# Patient Record
Sex: FEMALE | Race: OTHER | HISPANIC OR LATINO | Employment: UNEMPLOYED | URBAN - METROPOLITAN AREA
[De-identification: names, ages, dates, MRNs, and addresses within clinical notes are randomized per-mention and may not be internally consistent; named-entity substitution may affect disease eponyms.]

---

## 2017-06-27 ENCOUNTER — HOSPITAL ENCOUNTER (EMERGENCY)
Facility: HOSPITAL | Age: 42
Discharge: HOME | End: 2017-06-27
Attending: EMERGENCY MEDICINE | Admitting: EMERGENCY MEDICINE
Payer: COMMERCIAL

## 2017-06-27 VITALS
RESPIRATION RATE: 18 BRPM | SYSTOLIC BLOOD PRESSURE: 176 MMHG | HEART RATE: 90 BPM | TEMPERATURE: 98.1 F | DIASTOLIC BLOOD PRESSURE: 82 MMHG | OXYGEN SATURATION: 100 %

## 2017-06-27 DIAGNOSIS — R10.9 ACUTE ABDOMINAL PAIN: Primary | ICD-10-CM

## 2017-06-27 DIAGNOSIS — E86.0 MILD DEHYDRATION: ICD-10-CM

## 2017-06-27 DIAGNOSIS — R19.7 NAUSEA, VOMITING AND DIARRHEA: ICD-10-CM

## 2017-06-27 DIAGNOSIS — R11.2 NAUSEA, VOMITING AND DIARRHEA: ICD-10-CM

## 2017-06-27 LAB
ALBUMIN SERPL BCP-MCNC: 4.4 G/DL (ref 3.5–5)
ALP SERPL-CCNC: 73 U/L (ref 46–116)
ALT SERPL W P-5'-P-CCNC: 38 U/L (ref 12–78)
ANION GAP SERPL CALCULATED.3IONS-SCNC: 8 MMOL/L (ref 4–13)
AST SERPL W P-5'-P-CCNC: 26 U/L (ref 5–45)
BACTERIA UR QL AUTO: ABNORMAL /HPF
BASOPHILS # BLD AUTO: 0.01 THOUSANDS/ΜL (ref 0–0.1)
BASOPHILS NFR BLD AUTO: 0 % (ref 0–1)
BILIRUB SERPL-MCNC: 0.55 MG/DL (ref 0.2–1)
BILIRUB UR QL STRIP: NEGATIVE
BUN SERPL-MCNC: 11 MG/DL (ref 5–25)
CALCIUM SERPL-MCNC: 9.1 MG/DL (ref 8.3–10.1)
CHLORIDE SERPL-SCNC: 103 MMOL/L (ref 100–108)
CLARITY UR: CLEAR
CO2 SERPL-SCNC: 27 MMOL/L (ref 21–32)
COLOR UR: YELLOW
CREAT SERPL-MCNC: 0.83 MG/DL (ref 0.6–1.3)
EOSINOPHIL # BLD AUTO: 0 THOUSAND/ΜL (ref 0–0.61)
EOSINOPHIL NFR BLD AUTO: 0 % (ref 0–6)
ERYTHROCYTE [DISTWIDTH] IN BLOOD BY AUTOMATED COUNT: 12.8 % (ref 11.6–15.1)
GFR SERPL CREATININE-BSD FRML MDRD: >60 ML/MIN/1.73SQ M
GLUCOSE SERPL-MCNC: 151 MG/DL (ref 65–140)
GLUCOSE UR STRIP-MCNC: NEGATIVE MG/DL
HCG UR QL: NEGATIVE
HCT VFR BLD AUTO: 41 % (ref 34.8–46.1)
HGB BLD-MCNC: 15 G/DL (ref 11.5–15.4)
HGB UR QL STRIP.AUTO: ABNORMAL
KETONES UR STRIP-MCNC: ABNORMAL MG/DL
LEUKOCYTE ESTERASE UR QL STRIP: NEGATIVE
LIPASE SERPL-CCNC: 119 U/L (ref 73–393)
LYMPHOCYTES # BLD AUTO: 1.13 THOUSANDS/ΜL (ref 0.6–4.47)
LYMPHOCYTES NFR BLD AUTO: 9 % (ref 14–44)
MCH RBC QN AUTO: 33.8 PG (ref 26.8–34.3)
MCHC RBC AUTO-ENTMCNC: 36.6 G/DL (ref 31.4–37.4)
MCV RBC AUTO: 92 FL (ref 82–98)
MONOCYTES # BLD AUTO: 0.19 THOUSAND/ΜL (ref 0.17–1.22)
MONOCYTES NFR BLD AUTO: 2 % (ref 4–12)
NEUTROPHILS # BLD AUTO: 11.33 THOUSANDS/ΜL (ref 1.85–7.62)
NEUTS SEG NFR BLD AUTO: 89 % (ref 43–75)
NITRITE UR QL STRIP: NEGATIVE
NON-SQ EPI CELLS URNS QL MICRO: ABNORMAL /HPF
NRBC BLD AUTO-RTO: 0 /100 WBCS
PH UR STRIP.AUTO: 7 [PH] (ref 4.5–8)
PLATELET # BLD AUTO: 320 THOUSANDS/UL (ref 149–390)
PMV BLD AUTO: 10 FL (ref 8.9–12.7)
POTASSIUM SERPL-SCNC: 4.2 MMOL/L (ref 3.5–5.3)
PROT SERPL-MCNC: 8.2 G/DL (ref 6.4–8.2)
PROT UR STRIP-MCNC: ABNORMAL MG/DL
RBC # BLD AUTO: 4.44 MILLION/UL (ref 3.81–5.12)
RBC #/AREA URNS AUTO: ABNORMAL /HPF
SODIUM SERPL-SCNC: 138 MMOL/L (ref 136–145)
SP GR UR STRIP.AUTO: 1.02 (ref 1–1.03)
UROBILINOGEN UR QL STRIP.AUTO: 0.2 E.U./DL
WBC # BLD AUTO: 12.66 THOUSAND/UL (ref 4.31–10.16)
WBC #/AREA URNS AUTO: ABNORMAL /HPF

## 2017-06-27 PROCEDURE — 81002 URINALYSIS NONAUTO W/O SCOPE: CPT | Performed by: EMERGENCY MEDICINE

## 2017-06-27 PROCEDURE — 36415 COLL VENOUS BLD VENIPUNCTURE: CPT | Performed by: EMERGENCY MEDICINE

## 2017-06-27 PROCEDURE — 96361 HYDRATE IV INFUSION ADD-ON: CPT

## 2017-06-27 PROCEDURE — 81001 URINALYSIS AUTO W/SCOPE: CPT

## 2017-06-27 PROCEDURE — 85025 COMPLETE CBC W/AUTO DIFF WBC: CPT | Performed by: EMERGENCY MEDICINE

## 2017-06-27 PROCEDURE — 80053 COMPREHEN METABOLIC PANEL: CPT | Performed by: EMERGENCY MEDICINE

## 2017-06-27 PROCEDURE — 83690 ASSAY OF LIPASE: CPT | Performed by: EMERGENCY MEDICINE

## 2017-06-27 PROCEDURE — 96374 THER/PROPH/DIAG INJ IV PUSH: CPT

## 2017-06-27 PROCEDURE — 99283 EMERGENCY DEPT VISIT LOW MDM: CPT

## 2017-06-27 PROCEDURE — 81025 URINE PREGNANCY TEST: CPT | Performed by: EMERGENCY MEDICINE

## 2017-06-27 RX ORDER — ONDANSETRON 2 MG/ML
4 INJECTION INTRAMUSCULAR; INTRAVENOUS ONCE AS NEEDED
Status: COMPLETED | OUTPATIENT
Start: 2017-06-27 | End: 2017-06-27

## 2017-06-27 RX ORDER — ONDANSETRON 2 MG/ML
INJECTION INTRAMUSCULAR; INTRAVENOUS
Status: COMPLETED
Start: 2017-06-27 | End: 2017-06-27

## 2017-06-27 RX ORDER — MAGNESIUM HYDROXIDE/ALUMINUM HYDROXICE/SIMETHICONE 120; 1200; 1200 MG/30ML; MG/30ML; MG/30ML
30 SUSPENSION ORAL ONCE
Status: COMPLETED | OUTPATIENT
Start: 2017-06-27 | End: 2017-06-27

## 2017-06-27 RX ORDER — ONDANSETRON 4 MG/1
4 TABLET, FILM COATED ORAL EVERY 6 HOURS
Qty: 7 TABLET | Refills: 0 | Status: SHIPPED | OUTPATIENT
Start: 2017-06-27 | End: 2019-02-28

## 2017-06-27 RX ORDER — DICYCLOMINE HCL 20 MG
20 TABLET ORAL ONCE
Status: COMPLETED | OUTPATIENT
Start: 2017-06-27 | End: 2017-06-27

## 2017-06-27 RX ORDER — SODIUM CHLORIDE 9 MG/ML
1000 INJECTION, SOLUTION INTRAVENOUS ONCE
Status: COMPLETED | OUTPATIENT
Start: 2017-06-27 | End: 2017-06-27

## 2017-06-27 RX ORDER — DICYCLOMINE HCL 20 MG
20 TABLET ORAL 2 TIMES DAILY
Qty: 20 TABLET | Refills: 0 | Status: SHIPPED | OUTPATIENT
Start: 2017-06-27 | End: 2019-02-28

## 2017-06-27 RX ADMIN — LIDOCAINE HYDROCHLORIDE 15 ML: 20 SOLUTION ORAL; TOPICAL at 13:42

## 2017-06-27 RX ADMIN — ALUMINUM HYDROXIDE, MAGNESIUM HYDROXIDE, AND SIMETHICONE 30 ML: 200; 200; 20 SUSPENSION ORAL at 13:42

## 2017-06-27 RX ADMIN — SODIUM CHLORIDE 1000 ML/HR: 0.9 INJECTION, SOLUTION INTRAVENOUS at 13:23

## 2017-06-27 RX ADMIN — DICYCLOMINE HYDROCHLORIDE 20 MG: 20 TABLET ORAL at 13:41

## 2017-06-27 RX ADMIN — ONDANSETRON 4 MG: 2 INJECTION INTRAMUSCULAR; INTRAVENOUS at 13:24

## 2019-02-28 ENCOUNTER — APPOINTMENT (EMERGENCY)
Dept: CT IMAGING | Facility: HOSPITAL | Age: 44
End: 2019-02-28

## 2019-02-28 ENCOUNTER — HOSPITAL ENCOUNTER (EMERGENCY)
Facility: HOSPITAL | Age: 44
Discharge: HOME | End: 2019-02-28
Attending: EMERGENCY MEDICINE

## 2019-02-28 ENCOUNTER — APPOINTMENT (EMERGENCY)
Dept: RADIOLOGY | Facility: HOSPITAL | Age: 44
End: 2019-02-28

## 2019-02-28 VITALS
TEMPERATURE: 98.3 F | DIASTOLIC BLOOD PRESSURE: 61 MMHG | WEIGHT: 153.8 LBS | HEART RATE: 84 BPM | RESPIRATION RATE: 18 BRPM | OXYGEN SATURATION: 99 % | SYSTOLIC BLOOD PRESSURE: 130 MMHG

## 2019-02-28 DIAGNOSIS — R10.9 ABDOMINAL PAIN: ICD-10-CM

## 2019-02-28 DIAGNOSIS — E83.42 HYPOMAGNESEMIA: Primary | ICD-10-CM

## 2019-02-28 DIAGNOSIS — N85.00 ENDOMETRIUM, HYPERPLASIA: ICD-10-CM

## 2019-02-28 DIAGNOSIS — R11.2 NAUSEA AND VOMITING: ICD-10-CM

## 2019-02-28 DIAGNOSIS — E87.6 HYPOKALEMIA: ICD-10-CM

## 2019-02-28 LAB
ALBUMIN SERPL BCP-MCNC: 4.4 G/DL (ref 3.5–5)
ALP SERPL-CCNC: 88 U/L (ref 46–116)
ALT SERPL W P-5'-P-CCNC: 43 U/L (ref 12–78)
AMORPH URATE CRY URNS QL MICRO: ABNORMAL /HPF
ANION GAP SERPL CALCULATED.3IONS-SCNC: 16 MMOL/L (ref 4–13)
APTT PPP: 24 SECONDS (ref 26–38)
AST SERPL W P-5'-P-CCNC: 26 U/L (ref 5–45)
ATRIAL RATE: 88 BPM
BACTERIA UR QL AUTO: ABNORMAL /HPF
BASOPHILS # BLD AUTO: 0.03 THOUSANDS/ΜL (ref 0–0.1)
BASOPHILS NFR BLD AUTO: 0 % (ref 0–1)
BILIRUB SERPL-MCNC: 0.64 MG/DL (ref 0.2–1)
BILIRUB UR QL STRIP: NEGATIVE
BUN SERPL-MCNC: 10 MG/DL (ref 5–25)
CALCIUM SERPL-MCNC: 9.3 MG/DL (ref 8.3–10.1)
CHLORIDE SERPL-SCNC: 100 MMOL/L (ref 100–108)
CLARITY UR: ABNORMAL
CO2 SERPL-SCNC: 23 MMOL/L (ref 21–32)
COLOR UR: ABNORMAL
CREAT SERPL-MCNC: 0.74 MG/DL (ref 0.6–1.3)
DEPRECATED D DIMER PPP: <270 NG/ML (FEU)
EOSINOPHIL # BLD AUTO: 0 THOUSAND/ΜL (ref 0–0.61)
EOSINOPHIL NFR BLD AUTO: 0 % (ref 0–6)
ERYTHROCYTE [DISTWIDTH] IN BLOOD BY AUTOMATED COUNT: 12.6 % (ref 11.6–15.1)
EXT PREG TEST URINE: NEGATIVE
GFR SERPL CREATININE-BSD FRML MDRD: 99 ML/MIN/1.73SQ M
GLUCOSE SERPL-MCNC: 139 MG/DL (ref 65–140)
GLUCOSE UR STRIP-MCNC: NEGATIVE MG/DL
HCT VFR BLD AUTO: 42.5 % (ref 34.8–46.1)
HGB BLD-MCNC: 14.7 G/DL (ref 11.5–15.4)
HGB UR QL STRIP.AUTO: ABNORMAL
IMM GRANULOCYTES # BLD AUTO: 0.03 THOUSAND/UL (ref 0–0.2)
IMM GRANULOCYTES NFR BLD AUTO: 0 % (ref 0–2)
INR PPP: 0.97 (ref 0.86–1.17)
KETONES UR STRIP-MCNC: ABNORMAL MG/DL
LACTATE SERPL-SCNC: 1.8 MMOL/L (ref 0.5–2)
LEUKOCYTE ESTERASE UR QL STRIP: NEGATIVE
LIPASE SERPL-CCNC: 102 U/L (ref 73–393)
LYMPHOCYTES # BLD AUTO: 1.46 THOUSANDS/ΜL (ref 0.6–4.47)
LYMPHOCYTES NFR BLD AUTO: 11 % (ref 14–44)
MAGNESIUM SERPL-MCNC: 1.5 MG/DL (ref 1.6–2.6)
MCH RBC QN AUTO: 33.6 PG (ref 26.8–34.3)
MCHC RBC AUTO-ENTMCNC: 34.6 G/DL (ref 31.4–37.4)
MCV RBC AUTO: 97 FL (ref 82–98)
MONOCYTES # BLD AUTO: 0.48 THOUSAND/ΜL (ref 0.17–1.22)
MONOCYTES NFR BLD AUTO: 4 % (ref 4–12)
NEUTROPHILS # BLD AUTO: 10.84 THOUSANDS/ΜL (ref 1.85–7.62)
NEUTS SEG NFR BLD AUTO: 85 % (ref 43–75)
NITRITE UR QL STRIP: NEGATIVE
NON-SQ EPI CELLS URNS QL MICRO: ABNORMAL /HPF
NRBC BLD AUTO-RTO: 0 /100 WBCS
P AXIS: 74 DEGREES
PH UR STRIP.AUTO: 5.5 [PH] (ref 4.5–8)
PLATELET # BLD AUTO: 382 THOUSANDS/UL (ref 149–390)
PMV BLD AUTO: 9.6 FL (ref 8.9–12.7)
POTASSIUM SERPL-SCNC: 3.4 MMOL/L (ref 3.5–5.3)
PR INTERVAL: 114 MS
PROT SERPL-MCNC: 8.6 G/DL (ref 6.4–8.2)
PROT UR STRIP-MCNC: ABNORMAL MG/DL
PROTHROMBIN TIME: 13 SECONDS (ref 11.8–14.2)
QRS AXIS: 73 DEGREES
QRSD INTERVAL: 78 MS
QT INTERVAL: 368 MS
QTC INTERVAL: 445 MS
RBC # BLD AUTO: 4.37 MILLION/UL (ref 3.81–5.12)
RBC #/AREA URNS AUTO: ABNORMAL /HPF
SODIUM SERPL-SCNC: 139 MMOL/L (ref 136–145)
SP GR UR STRIP.AUTO: >=1.03 (ref 1–1.03)
T WAVE AXIS: 66 DEGREES
TROPONIN I SERPL-MCNC: <0.02 NG/ML
UROBILINOGEN UR QL STRIP.AUTO: 0.2 E.U./DL
VENTRICULAR RATE: 88 BPM
WBC # BLD AUTO: 12.84 THOUSAND/UL (ref 4.31–10.16)
WBC #/AREA URNS AUTO: ABNORMAL /HPF

## 2019-02-28 PROCEDURE — 81003 URINALYSIS AUTO W/O SCOPE: CPT

## 2019-02-28 PROCEDURE — 96376 TX/PRO/DX INJ SAME DRUG ADON: CPT

## 2019-02-28 PROCEDURE — 71046 X-RAY EXAM CHEST 2 VIEWS: CPT

## 2019-02-28 PROCEDURE — 83605 ASSAY OF LACTIC ACID: CPT | Performed by: EMERGENCY MEDICINE

## 2019-02-28 PROCEDURE — 85025 COMPLETE CBC W/AUTO DIFF WBC: CPT | Performed by: EMERGENCY MEDICINE

## 2019-02-28 PROCEDURE — 84484 ASSAY OF TROPONIN QUANT: CPT | Performed by: EMERGENCY MEDICINE

## 2019-02-28 PROCEDURE — 70450 CT HEAD/BRAIN W/O DYE: CPT

## 2019-02-28 PROCEDURE — 80053 COMPREHEN METABOLIC PANEL: CPT | Performed by: EMERGENCY MEDICINE

## 2019-02-28 PROCEDURE — 96361 HYDRATE IV INFUSION ADD-ON: CPT

## 2019-02-28 PROCEDURE — 83735 ASSAY OF MAGNESIUM: CPT | Performed by: EMERGENCY MEDICINE

## 2019-02-28 PROCEDURE — 85379 FIBRIN DEGRADATION QUANT: CPT | Performed by: EMERGENCY MEDICINE

## 2019-02-28 PROCEDURE — 85730 THROMBOPLASTIN TIME PARTIAL: CPT | Performed by: EMERGENCY MEDICINE

## 2019-02-28 PROCEDURE — 93005 ELECTROCARDIOGRAM TRACING: CPT

## 2019-02-28 PROCEDURE — 81001 URINALYSIS AUTO W/SCOPE: CPT

## 2019-02-28 PROCEDURE — 81025 URINE PREGNANCY TEST: CPT | Performed by: EMERGENCY MEDICINE

## 2019-02-28 PROCEDURE — 36415 COLL VENOUS BLD VENIPUNCTURE: CPT | Performed by: EMERGENCY MEDICINE

## 2019-02-28 PROCEDURE — 74177 CT ABD & PELVIS W/CONTRAST: CPT

## 2019-02-28 PROCEDURE — 99285 EMERGENCY DEPT VISIT HI MDM: CPT

## 2019-02-28 PROCEDURE — 93010 ELECTROCARDIOGRAM REPORT: CPT | Performed by: INTERNAL MEDICINE

## 2019-02-28 PROCEDURE — 96365 THER/PROPH/DIAG IV INF INIT: CPT

## 2019-02-28 PROCEDURE — 96375 TX/PRO/DX INJ NEW DRUG ADDON: CPT

## 2019-02-28 PROCEDURE — 85610 PROTHROMBIN TIME: CPT | Performed by: EMERGENCY MEDICINE

## 2019-02-28 PROCEDURE — 83690 ASSAY OF LIPASE: CPT | Performed by: EMERGENCY MEDICINE

## 2019-02-28 RX ORDER — ONDANSETRON 2 MG/ML
4 INJECTION INTRAMUSCULAR; INTRAVENOUS ONCE
Status: COMPLETED | OUTPATIENT
Start: 2019-02-28 | End: 2019-02-28

## 2019-02-28 RX ORDER — MAGNESIUM SULFATE 1 G/100ML
1 INJECTION INTRAVENOUS ONCE
Status: COMPLETED | OUTPATIENT
Start: 2019-02-28 | End: 2019-02-28

## 2019-02-28 RX ORDER — ONDANSETRON 4 MG/1
4 TABLET, ORALLY DISINTEGRATING ORAL EVERY 8 HOURS PRN
Qty: 20 TABLET | Refills: 0 | Status: SHIPPED | OUTPATIENT
Start: 2019-02-28 | End: 2019-03-03

## 2019-02-28 RX ORDER — POTASSIUM CHLORIDE 750 MG/1
10 TABLET, EXTENDED RELEASE ORAL 2 TIMES DAILY
Qty: 4 TABLET | Refills: 0 | Status: SHIPPED | OUTPATIENT
Start: 2019-02-28 | End: 2019-03-02

## 2019-02-28 RX ADMIN — ONDANSETRON 4 MG: 2 INJECTION INTRAMUSCULAR; INTRAVENOUS at 17:57

## 2019-02-28 RX ADMIN — SODIUM CHLORIDE 1000 ML: 0.9 INJECTION, SOLUTION INTRAVENOUS at 18:13

## 2019-02-28 RX ADMIN — MAGNESIUM SULFATE HEPTAHYDRATE 1 G: 1 INJECTION, SOLUTION INTRAVENOUS at 19:26

## 2019-02-28 RX ADMIN — ONDANSETRON 4 MG: 2 INJECTION INTRAMUSCULAR; INTRAVENOUS at 19:15

## 2019-02-28 RX ADMIN — SODIUM CHLORIDE 1000 ML: 0.9 INJECTION, SOLUTION INTRAVENOUS at 17:56

## 2019-02-28 RX ADMIN — MORPHINE SULFATE 2 MG: 2 INJECTION, SOLUTION INTRAMUSCULAR; INTRAVENOUS at 19:15

## 2019-02-28 RX ADMIN — IOHEXOL 100 ML: 350 INJECTION, SOLUTION INTRAVENOUS at 19:07

## 2019-02-28 NOTE — ED PROVIDER NOTES
History  Chief Complaint   Patient presents with    Abdominal Pain     Pt reports epigastric pain, NV, chills since last night, reports history of ulcers, denies urinary symptoms  74-year-old female presents complaining of epigastric pain radiating into her chest which began last evening at 0 100  She states she vomited multiple times throughout the course the evening  She states she has pain in her subxiphoid area which radiates through to her back  She states the last time she vomited was approximately noon today  She feels very wiped out and tired  She states the pain does intensified when she takes deep breath  History provided by:  Patient  Abdominal Pain   Pain location:  Epigastric  Pain quality: aching, bloating and burning    Pain radiates to:  Chest  Pain severity:  Mild  Onset quality:  Gradual  Duration:  17 hours  Timing:  Constant  Progression:  Waxing and waning  Chronicity:  New  Context: not alcohol use, not awakening from sleep, not diet changes and not eating    Relieved by:  Nothing  Worsened by:  Nothing  Ineffective treatments:  None tried  Associated symptoms: anorexia and nausea    Associated symptoms: no chest pain, no chills and no dysuria    Risk factors: no alcohol abuse, no aspirin use and not elderly        None       History reviewed  No pertinent past medical history  Past Surgical History:   Procedure Laterality Date    TONSILLECTOMY         History reviewed  No pertinent family history  I have reviewed and agree with the history as documented  Social History     Tobacco Use    Smoking status: Current Every Day Smoker    Smokeless tobacco: Never Used   Substance Use Topics    Alcohol use: Yes    Drug use: No        Review of Systems   Constitutional: Negative for activity change, appetite change and chills  HENT: Negative for congestion, dental problem, drooling and ear discharge  Eyes: Negative for pain, discharge and itching     Respiratory: Negative for apnea, choking and chest tightness  Cardiovascular: Negative for chest pain  Gastrointestinal: Positive for abdominal pain, anorexia and nausea  Endocrine: Negative for cold intolerance, heat intolerance and polydipsia  Genitourinary: Negative for difficulty urinating, dyspareunia and dysuria  Musculoskeletal: Negative for arthralgias, back pain and gait problem  Skin: Negative for color change, pallor and rash  Allergic/Immunologic: Negative for environmental allergies and food allergies  Neurological: Negative for dizziness and facial asymmetry  Hematological: Negative for adenopathy  Psychiatric/Behavioral: Negative for agitation, behavioral problems and confusion  All other systems reviewed and are negative  Physical Exam  Physical Exam   Constitutional: She appears well-developed and well-nourished  Non-toxic appearance  She does not appear ill  HENT:   Head: Normocephalic  Mouth/Throat: Oropharynx is clear and moist  No oropharyngeal exudate  Eyes: Pupils are equal, round, and reactive to light  No scleral icterus  Cardiovascular: Normal rate  Exam reveals no friction rub  No murmur heard  Pulmonary/Chest: Effort normal  No stridor  No respiratory distress  She has no wheezes  She has no rales  Abdominal: Normal appearance and bowel sounds are normal  She exhibits no distension, no pulsatile liver, no fluid wave and no ascites  There is tenderness in the epigastric area  There is no rigidity, no rebound and no guarding  Neurological: She is alert  Skin: Skin is warm  Capillary refill takes less than 2 seconds  She is not diaphoretic  No cyanosis  Psychiatric: She has a normal mood and affect  Vitals reviewed        Vital Signs  ED Triage Vitals [02/28/19 1714]   Temperature Pulse Respirations Blood Pressure SpO2   98 3 °F (36 8 °C) (!) 115 20 (!) 202/96 100 %      Temp Source Heart Rate Source Patient Position - Orthostatic VS BP Location FiO2 (%)   Oral Monitor Sitting Right arm --      Pain Score       6           Vitals:    02/28/19 1714 02/28/19 1800   BP: (!) 202/96 (!) 185/88   Pulse: (!) 115 100   Patient Position - Orthostatic VS: Sitting Lying       Visual Acuity      ED Medications  Medications   magnesium sulfate IVPB (premix) SOLN 1 g (1 g Intravenous New Bag 2/28/19 1926)   ondansetron (ZOFRAN) injection 4 mg (4 mg Intravenous Given 2/28/19 1757)   sodium chloride 0 9 % bolus 1,000 mL (1,000 mL Intravenous New Bag 2/28/19 1756)   sodium chloride 0 9 % bolus 1,000 mL (1,000 mL Intravenous New Bag 2/28/19 1813)   morphine injection 2 mg (2 mg Intravenous Given 2/28/19 1915)   ondansetron (ZOFRAN) injection 4 mg (4 mg Intravenous Given 2/28/19 1915)   iohexol (OMNIPAQUE) 350 MG/ML injection (SINGLE-DOSE) 100 mL (100 mL Intravenous Given 2/28/19 1907)       Diagnostic Studies  Results Reviewed     Procedure Component Value Units Date/Time    D-Dimer [[id number]]  (Normal) Collected:  02/28/19 1750    Lab Status:  Final result Specimen:  Blood from Arm, Right Updated:  02/28/19 1828     D-Dimer, Quant <270 ng/ml (FEU)     Protime-INR [[id number]]  (Normal) Collected:  02/28/19 1750    Lab Status:  Final result Specimen:  Blood from Arm, Right Updated:  02/28/19 1827     Protime 13 0 seconds      INR 0 97    APTT [[id number]]  (Abnormal) Collected:  02/28/19 1750    Lab Status:  Final result Specimen:  Blood from Arm, Right Updated:  02/28/19 1827     PTT 24 seconds     Urine Microscopic [[id number]]  (Abnormal) Collected:  02/28/19 1751    Lab Status:  Final result Specimen:  Urine, Clean Catch Updated:  02/28/19 1827     RBC, UA 1-2 /hpf      WBC, UA 0-1 /hpf      Epithelial Cells Occasional /hpf      Bacteria, UA Occasional /hpf      AMORPH URATES Moderate /hpf     Lactic acid, plasma [[id number]]  (Normal) Collected:  02/28/19 1750    Lab Status:  Final result Specimen:  Blood from Arm, Right Updated:  02/28/19 1818     LACTIC ACID 1 8 mmol/L     Narrative: Result may be elevated if tourniquet was used during collection  Troponin I [[id number]]  (Normal) Collected:  02/28/19 1750    Lab Status:  Final result Specimen:  Blood from Arm, Right Updated:  02/28/19 1817     Troponin I <0 02 ng/mL     Comprehensive metabolic panel [[id number]]  (Abnormal) Collected:  02/28/19 1750    Lab Status:  Final result Specimen:  Blood from Arm, Right Updated:  02/28/19 1815     Sodium 139 mmol/L      Potassium 3 4 mmol/L      Chloride 100 mmol/L      CO2 23 mmol/L      ANION GAP 16 mmol/L      BUN 10 mg/dL      Creatinine 0 74 mg/dL      Glucose 139 mg/dL      Calcium 9 3 mg/dL      AST 26 U/L      ALT 43 U/L      Alkaline Phosphatase 88 U/L      Total Protein 8 6 g/dL      Albumin 4 4 g/dL      Total Bilirubin 0 64 mg/dL      eGFR 99 ml/min/1 73sq m     Narrative:       National Kidney Disease Education Program recommendations are as follows:  GFR calculation is accurate only with a steady state creatinine  Chronic Kidney disease less than 60 ml/min/1 73 sq  meters  Kidney failure less than 15 ml/min/1 73 sq  meters      Magnesium [[id number]]  (Abnormal) Collected:  02/28/19 1750    Lab Status:  Final result Specimen:  Blood from Arm, Right Updated:  02/28/19 1815     Magnesium 1 5 mg/dL     Lipase [[id number]]  (Normal) Collected:  02/28/19 1750    Lab Status:  Final result Specimen:  Blood from Arm, Right Updated:  02/28/19 1815     Lipase 102 u/L     POCT pregnancy, urine [[id number]]  (Normal) Resulted:  02/28/19 1807    Lab Status:  Final result Updated:  02/28/19 1807     EXT PREG TEST UR (Ref: Negative) Negative    CBC and differential [[id number]]  (Abnormal) Collected:  02/28/19 1750    Lab Status:  Final result Specimen:  Blood from Arm, Right Updated:  02/28/19 1802     WBC 12 84 Thousand/uL      RBC 4 37 Million/uL      Hemoglobin 14 7 g/dL      Hematocrit 42 5 %      MCV 97 fL      MCH 33 6 pg      MCHC 34 6 g/dL      RDW 12 6 %      MPV 9 6 fL      Platelets 384 Thousands/uL nRBC 0 /100 WBCs      Neutrophils Relative 85 %      Immat GRANS % 0 %      Lymphocytes Relative 11 %      Monocytes Relative 4 %      Eosinophils Relative 0 %      Basophils Relative 0 %      Neutrophils Absolute 10 84 Thousands/µL      Immature Grans Absolute 0 03 Thousand/uL      Lymphocytes Absolute 1 46 Thousands/µL      Monocytes Absolute 0 48 Thousand/µL      Eosinophils Absolute 0 00 Thousand/µL      Basophils Absolute 0 03 Thousands/µL     ED Urine Macroscopic [[id number]]  (Abnormal) Collected:  02/28/19 1751    Lab Status:  Final result Specimen:  Urine Updated:  02/28/19 1750     Color, UA Nela     Clarity, UA Cloudy     pH, UA 5 5     Leukocytes, UA Negative     Nitrite, UA Negative     Protein,  (2+) mg/dl      Glucose, UA Negative mg/dl      Ketones, UA 80 (3+) mg/dl      Urobilinogen, UA 0 2 E U /dl      Bilirubin, UA Negative     Blood, UA Small     Specific [place], UA >=1 030    Narrative:       CLINITEK RESULT                 CT abdomen pelvis with contrast   Final Result by Abbie Fitzpatrick MD (02/28 1928)      No acute inflammatory changes in the abdomen or pelvis to account for this patient's epigastric pain  Endometrial thickening up to 23 mm  Follow-up with pelvic ultrasound in 2 weeks  The study was marked in EPIC for significant notification  Workstation performed: [id number]         CT head without contrast   Final Result by Abbie Fitzpatrick MD (02/28 1919)      No acute intracranial abnormality                    Workstation performed: [id number]         XR chest 2 views   ED Interpretation by Geovany Doll DO (02/28 1928)   No infiltrate                  Procedures  Procedures       Phone Contacts  ED Phone Contact    ED Course  ED Course as of Feb 28 1950   u Feb 28, 2019   1932 CT head without contrast   1941 CT abdomen pelvis with contrast         HEART Risk Score      Most Recent Value   History  1 Filed at: 02/28/2019 1802   ECG  0 Filed at: 02/28/2019 1802   Age  0 Filed at: 02/28/2019 1802   Risk Factors  0 Filed at: 02/28/2019 1802   Troponin  0 Filed at: 02/28/2019 1802   Heart Score Risk Calculator   History  1 Filed at: 02/28/2019 1802   ECG  0 Filed at: 02/28/2019 1802   Age  0 Filed at: 02/28/2019 1802   Risk Factors  0 Filed at: 02/28/2019 1802   Troponin  0 Filed at: 02/28/2019 1802   HEART Score  1 Filed at: 02/28/2019 1802   HEART Score  1 Filed at: 02/28/2019 1802                            MDM  Number of Diagnoses or Management Options  Abdominal pain:   Endometrium, hyperplasia:   Hypokalemia:   Hypomagnesemia:   Nausea and vomiting:   Diagnosis management comments: Differential diagnosis 1  Pancreatitis 2  Colitis 3  GERD 4  Bowel obstruction  Provide the patient with antiemetics fluids check labs EKG troponin and D-dimer  Patient will have CT scan of her abdomen and pelvis performed      19:48  Patient feels well and wants to go home  Ispoke with the pt and her  about the abnormal uterine findings   She states she has a very regular period and it is never heavy   She will follow up with OBGyn        Amount and/or Complexity of Data Reviewed  Clinical lab tests: reviewed  Tests in the radiology section of CPT®: reviewed  Tests in the medicine section of CPT®: reviewed    Risk of Complications, Morbidity, and/or Mortality  Presenting problems: high  Diagnostic procedures: high  Management options: high        Disposition  Final diagnoses:   Hypomagnesemia   Hypokalemia   Abdominal pain   Nausea and vomiting   Endometrium, hyperplasia     Time reflects when diagnosis was documented in both MDM as applicable and the Disposition within this note     Time User Action Codes Description Comment    2/28/2019  6:31 PM Vicky Sitter Add [E83 42] Hypomagnesemia     2/28/2019  6:31 PM Vicky Sitter Add [E87 6] Hypokalemia     2/28/2019  6:31 PM Vicky Sitter Add [R10 9] Abdominal pain     2/28/2019  6:31 PM Yovany Barahona Annette Enriquez Add [R11 2] Nausea and vomiting     2/28/2019  7:48 PM Alina Aleman Add [N85 00] Endometrium, hyperplasia       ED Disposition     ED Disposition Condition Date/Time Comment    Discharge Stable u Feb 28, 2019  7:45 PM Rush Earthly discharge to home/self care  Follow-up Information     Follow up With Specialties Details Why [address] [facility]   [address]  [facility]            Patient's Medications   Discharge Prescriptions    ONDANSETRON (ZOFRAN-ODT) 4 MG DISINTEGRATING TABLET    Take 1 tablet (4 mg total) by mouth every 8 (eight) hours as needed for nausea or vomiting for up to 3 days       Start Date: 2/28/2019 End Date: 3/3/2019       Order Dose: 4 mg       Quantity: 20 tablet    Refills: 0    POTASSIUM CHLORIDE (K-DUR,KLOR-CON) 10 MEQ TABLET    Take 1 tablet (10 mEq total) by mouth 2 (two) times a day for 2 days       Start Date: 2/28/2019 End Date: 3/2/2019       Order Dose: 10 mEq       Quantity: 4 tablet    Refills: 0     No discharge procedures on file      ED Provider  Electronically Signed by           Fabiana Olmedo,   02/28/19 Real Kelley,   02/28/19 Sam

## 2019-02-28 NOTE — ED PROCEDURE NOTE
PROCEDURE  ECG 12 Lead Documentation  Date/Time: 2/28/2019 6:02 PM  Performed by: Maribell Delaney DO  Authorized by: Maribell Delaney DO     Indications / Diagnosis:  Abdominal pain   ECG reviewed by me, the ED Provider: yes    Patient location:  ED  Previous ECG:     Previous ECG:  Unavailable  Interpretation:     Interpretation: non-specific    Rate:     ECG rate:  88    ECG rate assessment: normal    Rhythm:     Rhythm: sinus rhythm    ST segments:     ST segments:  Non-specific         Maribell Delaney DO  02/28/19 1807

## 2019-03-01 NOTE — ED NOTES
Easton Puckett RN witness to start of Magnesium IV drip       Faustino Villafana, REED  02/28/19 4548

## 2019-11-19 ENCOUNTER — APPOINTMENT (EMERGENCY)
Dept: CT IMAGING | Facility: HOSPITAL | Age: 44
End: 2019-11-19

## 2019-11-19 ENCOUNTER — HOSPITAL ENCOUNTER (EMERGENCY)
Facility: HOSPITAL | Age: 44
Discharge: HOME | End: 2019-11-19
Attending: EMERGENCY MEDICINE | Admitting: EMERGENCY MEDICINE

## 2019-11-19 VITALS
HEART RATE: 102 BPM | OXYGEN SATURATION: 100 % | SYSTOLIC BLOOD PRESSURE: 163 MMHG | RESPIRATION RATE: 16 BRPM | DIASTOLIC BLOOD PRESSURE: 82 MMHG | TEMPERATURE: 98.4 F | WEIGHT: 149.03 LBS

## 2019-11-19 DIAGNOSIS — K52.9 GASTROENTERITIS: Primary | ICD-10-CM

## 2019-11-19 LAB
ALBUMIN SERPL BCP-MCNC: 4.1 G/DL (ref 3.5–5)
ALP SERPL-CCNC: 91 U/L (ref 46–116)
ALT SERPL W P-5'-P-CCNC: 27 U/L (ref 12–78)
ANION GAP SERPL CALCULATED.3IONS-SCNC: 14 MMOL/L (ref 4–13)
AST SERPL W P-5'-P-CCNC: 19 U/L (ref 5–45)
BACTERIA UR QL AUTO: ABNORMAL /HPF
BASOPHILS # BLD AUTO: 0.03 THOUSANDS/ΜL (ref 0–0.1)
BASOPHILS NFR BLD AUTO: 0 % (ref 0–1)
BILIRUB DIRECT SERPL-MCNC: 0.17 MG/DL (ref 0–0.2)
BILIRUB SERPL-MCNC: 0.55 MG/DL (ref 0.2–1)
BILIRUB UR QL STRIP: NEGATIVE
BUN SERPL-MCNC: 9 MG/DL (ref 5–25)
CALCIUM SERPL-MCNC: 9.3 MG/DL (ref 8.3–10.1)
CHLORIDE SERPL-SCNC: 100 MMOL/L (ref 100–108)
CLARITY UR: ABNORMAL
CO2 SERPL-SCNC: 24 MMOL/L (ref 21–32)
COLOR UR: YELLOW
CREAT SERPL-MCNC: 0.75 MG/DL (ref 0.6–1.3)
EOSINOPHIL # BLD AUTO: 0 THOUSAND/ΜL (ref 0–0.61)
EOSINOPHIL NFR BLD AUTO: 0 % (ref 0–6)
ERYTHROCYTE [DISTWIDTH] IN BLOOD BY AUTOMATED COUNT: 12.8 % (ref 11.6–15.1)
EXT PREG TEST URINE: NORMAL
EXT. CONTROL ED NAV: NORMAL
GFR SERPL CREATININE-BSD FRML MDRD: 97 ML/MIN/1.73SQ M
GLUCOSE SERPL-MCNC: 139 MG/DL (ref 65–140)
GLUCOSE UR STRIP-MCNC: NEGATIVE MG/DL
HCT VFR BLD AUTO: 45.6 % (ref 34.8–46.1)
HGB BLD-MCNC: 15.2 G/DL (ref 11.5–15.4)
HGB UR QL STRIP.AUTO: NEGATIVE
IMM GRANULOCYTES # BLD AUTO: 0.05 THOUSAND/UL (ref 0–0.2)
IMM GRANULOCYTES NFR BLD AUTO: 0 % (ref 0–2)
KETONES UR STRIP-MCNC: ABNORMAL MG/DL
LEUKOCYTE ESTERASE UR QL STRIP: NEGATIVE
LIPASE SERPL-CCNC: 107 U/L (ref 73–393)
LYMPHOCYTES # BLD AUTO: 1.49 THOUSANDS/ΜL (ref 0.6–4.47)
LYMPHOCYTES NFR BLD AUTO: 11 % (ref 14–44)
MCH RBC QN AUTO: 33.3 PG (ref 26.8–34.3)
MCHC RBC AUTO-ENTMCNC: 33.3 G/DL (ref 31.4–37.4)
MCV RBC AUTO: 100 FL (ref 82–98)
MONOCYTES # BLD AUTO: 0.5 THOUSAND/ΜL (ref 0.17–1.22)
MONOCYTES NFR BLD AUTO: 4 % (ref 4–12)
NEUTROPHILS # BLD AUTO: 11.05 THOUSANDS/ΜL (ref 1.85–7.62)
NEUTS SEG NFR BLD AUTO: 85 % (ref 43–75)
NITRITE UR QL STRIP: NEGATIVE
NON-SQ EPI CELLS URNS QL MICRO: ABNORMAL /HPF
NRBC BLD AUTO-RTO: 0 /100 WBCS
PH UR STRIP.AUTO: 5.5 [PH] (ref 4.5–8)
PLATELET # BLD AUTO: 350 THOUSANDS/UL (ref 149–390)
PMV BLD AUTO: 9.8 FL (ref 8.9–12.7)
POTASSIUM SERPL-SCNC: 3.8 MMOL/L (ref 3.5–5.3)
PROT SERPL-MCNC: 8 G/DL (ref 6.4–8.2)
PROT UR STRIP-MCNC: ABNORMAL MG/DL
RBC # BLD AUTO: 4.56 MILLION/UL (ref 3.81–5.12)
RBC #/AREA URNS AUTO: ABNORMAL /HPF
SODIUM SERPL-SCNC: 138 MMOL/L (ref 136–145)
SP GR UR STRIP.AUTO: >=1.03 (ref 1–1.03)
TROPONIN I SERPL-MCNC: <0.02 NG/ML
UROBILINOGEN UR QL STRIP.AUTO: 0.2 E.U./DL
WBC # BLD AUTO: 13.12 THOUSAND/UL (ref 4.31–10.16)
WBC #/AREA URNS AUTO: ABNORMAL /HPF

## 2019-11-19 PROCEDURE — 81025 URINE PREGNANCY TEST: CPT | Performed by: EMERGENCY MEDICINE

## 2019-11-19 PROCEDURE — 84484 ASSAY OF TROPONIN QUANT: CPT | Performed by: EMERGENCY MEDICINE

## 2019-11-19 PROCEDURE — 99284 EMERGENCY DEPT VISIT MOD MDM: CPT | Performed by: EMERGENCY MEDICINE

## 2019-11-19 PROCEDURE — 80048 BASIC METABOLIC PNL TOTAL CA: CPT | Performed by: EMERGENCY MEDICINE

## 2019-11-19 PROCEDURE — 99284 EMERGENCY DEPT VISIT MOD MDM: CPT

## 2019-11-19 PROCEDURE — 83690 ASSAY OF LIPASE: CPT | Performed by: EMERGENCY MEDICINE

## 2019-11-19 PROCEDURE — 85025 COMPLETE CBC W/AUTO DIFF WBC: CPT | Performed by: EMERGENCY MEDICINE

## 2019-11-19 PROCEDURE — 81001 URINALYSIS AUTO W/SCOPE: CPT

## 2019-11-19 PROCEDURE — 93005 ELECTROCARDIOGRAM TRACING: CPT

## 2019-11-19 PROCEDURE — 96361 HYDRATE IV INFUSION ADD-ON: CPT

## 2019-11-19 PROCEDURE — 96374 THER/PROPH/DIAG INJ IV PUSH: CPT

## 2019-11-19 PROCEDURE — 80076 HEPATIC FUNCTION PANEL: CPT | Performed by: EMERGENCY MEDICINE

## 2019-11-19 PROCEDURE — 74177 CT ABD & PELVIS W/CONTRAST: CPT

## 2019-11-19 PROCEDURE — 36415 COLL VENOUS BLD VENIPUNCTURE: CPT | Performed by: EMERGENCY MEDICINE

## 2019-11-19 RX ORDER — MAGNESIUM HYDROXIDE/ALUMINUM HYDROXICE/SIMETHICONE 120; 1200; 1200 MG/30ML; MG/30ML; MG/30ML
30 SUSPENSION ORAL ONCE
Status: COMPLETED | OUTPATIENT
Start: 2019-11-19 | End: 2019-11-19

## 2019-11-19 RX ORDER — ONDANSETRON 4 MG/1
4 TABLET, ORALLY DISINTEGRATING ORAL EVERY 8 HOURS PRN
Qty: 10 TABLET | Refills: 0 | Status: SHIPPED | OUTPATIENT
Start: 2019-11-19

## 2019-11-19 RX ORDER — FAMOTIDINE 20 MG/1
20 TABLET, FILM COATED ORAL 2 TIMES DAILY
Qty: 20 TABLET | Refills: 0 | Status: SHIPPED | OUTPATIENT
Start: 2019-11-19 | End: 2019-11-29

## 2019-11-19 RX ORDER — LIDOCAINE HYDROCHLORIDE 20 MG/ML
10 SOLUTION OROPHARYNGEAL ONCE
Status: COMPLETED | OUTPATIENT
Start: 2019-11-19 | End: 2019-11-19

## 2019-11-19 RX ORDER — ACETAMINOPHEN 325 MG/1
975 TABLET ORAL ONCE
Status: COMPLETED | OUTPATIENT
Start: 2019-11-19 | End: 2019-11-19

## 2019-11-19 RX ORDER — ONDANSETRON 2 MG/ML
4 INJECTION INTRAMUSCULAR; INTRAVENOUS ONCE
Status: COMPLETED | OUTPATIENT
Start: 2019-11-19 | End: 2019-11-19

## 2019-11-19 RX ADMIN — ACETAMINOPHEN 975 MG: 325 TABLET ORAL at 20:36

## 2019-11-19 RX ADMIN — ALUMINUM HYDROXIDE, MAGNESIUM HYDROXIDE, AND SIMETHICONE 30 ML: 200; 200; 20 SUSPENSION ORAL at 20:05

## 2019-11-19 RX ADMIN — IOHEXOL 100 ML: 350 INJECTION, SOLUTION INTRAVENOUS at 20:35

## 2019-11-19 RX ADMIN — LIDOCAINE HYDROCHLORIDE 10 ML: 20 SOLUTION ORAL; TOPICAL at 20:05

## 2019-11-19 RX ADMIN — SODIUM CHLORIDE 1000 ML: 0.9 INJECTION, SOLUTION INTRAVENOUS at 19:43

## 2019-11-19 RX ADMIN — ONDANSETRON 4 MG: 2 INJECTION INTRAMUSCULAR; INTRAVENOUS at 19:41

## 2019-11-20 LAB
ATRIAL RATE: 113 BPM
P AXIS: 79 DEGREES
PR INTERVAL: 130 MS
QRS AXIS: 77 DEGREES
QRSD INTERVAL: 70 MS
QT INTERVAL: 316 MS
QTC INTERVAL: 433 MS
T WAVE AXIS: 70 DEGREES
VENTRICULAR RATE: 113 BPM

## 2019-11-20 PROCEDURE — 93010 ELECTROCARDIOGRAM REPORT: CPT | Performed by: INTERNAL MEDICINE

## 2019-11-20 NOTE — DISCHARGE INSTRUCTIONS
Take Zofran as needed for nausea/vomiting  Take Pepcid as prescribed  Stay well hydrated    Call to establish care with family doctor  Return to ED if any new/worsening symptoms

## 2019-11-20 NOTE — ED PROVIDER NOTES
History  Chief Complaint   Patient presents with    Abdominal Pain     Patient presents complaining of abdominal discomfort since 0200, accompanied by N/V/D  Report symptoms are identical to ED visit this past February  74-year-old female presenting for evaluation of abdominal pain, nausea, vomiting and loose stools  Symptoms started around 2:00 a m  Alfredo Skaggs Reports numerous episodes of nonbloody nonbilious emesis  Reports a few episodes of loose, nonbloody stools  Reports pain to epigastric region as well as suprapubic region  No known sick contacts  No new/different foods, boyfriend at bedside ate the same food as her last night  Denies any fevers, but does report chills  Denies any CP, SOB, cough/URI symptoms, urinary complaints  No prior abdominal surgeries  A/P:  74-year-old female with abdominal pain, nausea, vomiting, diarrhea, will treat symptomatically, abdominal labs, CT A/P to assess for intra-abdominal pathology, UA to rule out urine infection, urine pregnancy, reassess, dispo accordingly          Prior to Admission Medications   Prescriptions Last Dose Informant Patient Reported? Taking?   ondansetron (ZOFRAN-ODT) 4 mg disintegrating tablet   No No   Sig: Take 1 tablet (4 mg total) by mouth every 8 (eight) hours as needed for nausea or vomiting for up to 3 days   potassium chloride (K-DUR,KLOR-CON) 10 mEq tablet   No No   Sig: Take 1 tablet (10 mEq total) by mouth 2 (two) times a day for 2 days      Facility-Administered Medications: None       History reviewed  No pertinent past medical history  Past Surgical History:   Procedure Laterality Date    TONSILLECTOMY         History reviewed  No pertinent family history  I have reviewed and agree with the history as documented  Social History     Tobacco Use    Smoking status: Current Every Day Smoker    Smokeless tobacco: Never Used   Substance Use Topics    Alcohol use:  Yes    Drug use: No        Review of Systems   Constitutional: Negative for chills, fever and unexpected weight change  HENT: Negative for ear pain, rhinorrhea and sore throat  Eyes: Negative for pain and visual disturbance  Respiratory: Negative for cough and shortness of breath  Cardiovascular: Negative for chest pain and leg swelling  Gastrointestinal: Positive for abdominal pain, diarrhea, nausea and vomiting  Negative for constipation  Endocrine: Negative for polydipsia, polyphagia and polyuria  Genitourinary: Negative for dysuria, frequency, hematuria and urgency  Musculoskeletal: Negative for back pain, myalgias and neck pain  Skin: Negative for color change and rash  Allergic/Immunologic: Negative for environmental allergies and immunocompromised state  Neurological: Negative for dizziness, weakness, light-headedness, numbness and headaches  Hematological: Negative for adenopathy  Does not bruise/bleed easily  Psychiatric/Behavioral: Negative for agitation and confusion  All other systems reviewed and are negative  Physical Exam  Physical Exam   Constitutional: She is oriented to person, place, and time  She appears well-developed and well-nourished  HENT:   Head: Normocephalic and atraumatic  Nose: Nose normal    Mouth/Throat: Oropharynx is clear and moist    Eyes: Conjunctivae and EOM are normal    Neck: Normal range of motion  Neck supple  Cardiovascular: Regular rhythm, normal heart sounds and intact distal pulses  Tachycardic   Pulmonary/Chest: Effort normal and breath sounds normal  No stridor  No respiratory distress  She has no wheezes  She has no rales  Abdominal: Soft  She exhibits no distension  There is tenderness in the epigastric area and suprapubic area  There is no rebound and no guarding  Mild tenderness to palpation in epigastric region, negative London sign  Mild tenderness to palpation suprapubic region  No rebound or guarding   Musculoskeletal: She exhibits no edema or deformity     Neurological: She is alert and oriented to person, place, and time  She exhibits normal muscle tone  Coordination normal    Skin: Skin is warm and dry  No rash noted  Psychiatric: She has a normal mood and affect  Judgment and thought content normal    Nursing note and vitals reviewed        Vital Signs  ED Triage Vitals   Temperature Pulse Respirations Blood Pressure SpO2   11/19/19 1813 11/19/19 1813 11/19/19 1813 11/19/19 1814 11/19/19 1813   98 4 °F (36 9 °C) (!) 124 18 (!) 174/95 99 %      Temp src Heart Rate Source Patient Position - Orthostatic VS BP Location FiO2 (%)   -- 11/19/19 1813 11/19/19 1813 11/19/19 1813 --    Monitor Sitting Right arm       Pain Score       11/19/19 1813       8           Vitals:    11/19/19 1813 11/19/19 1814 11/19/19 1944 11/19/19 2039   BP:  (!) 174/95 (!) 188/85 163/82   Pulse: (!) 124  (!) 111 102   Patient Position - Orthostatic VS: Sitting  Lying Lying         Visual Acuity      ED Medications  Medications   sodium chloride 0 9 % bolus 1,000 mL (0 mL Intravenous Stopped 11/19/19 2104)   ondansetron (ZOFRAN) injection 4 mg (4 mg Intravenous Given 11/19/19 1941)   aluminum-magnesium hydroxide-simethicone (MYLANTA) 200-200-20 mg/5 mL oral suspension 30 mL (30 mL Oral Given 11/19/19 2005)   Lidocaine Viscous HCl (XYLOCAINE) 2 % mucosal solution 10 mL (10 mL Oral Given 11/19/19 2005)   acetaminophen (TYLENOL) tablet 975 mg (975 mg Oral Given 11/19/19 2036)   iohexol (OMNIPAQUE) 350 MG/ML injection (SINGLE-DOSE) 100 mL (100 mL Intravenous Given 11/19/19 2035)       Diagnostic Studies  Results Reviewed     Procedure Component Value Units Date/Time    Urine Microscopic [[id number]]  (Abnormal) Collected:  11/19/19 1942    Lab Status:  Final result Specimen:  Urine, Clean Catch Updated:  11/19/19 2028     RBC, UA 2-4 /hpf      WBC, UA None Seen /hpf      Epithelial Cells Moderate /hpf      Bacteria, UA Occasional /hpf     Troponin I [[id number]]  (Normal) Collected:  11/19/19 1940    Lab Status:  Final result Specimen:  Blood from Arm, Right Updated:  11/19/19 2026     Troponin I <0 02 ng/mL     Basic metabolic panel [[id number]]  (Abnormal) Collected:  11/19/19 1940    Lab Status:  Final result Specimen:  Blood from Arm, Right Updated:  11/19/19 2011     Sodium 138 mmol/L      Potassium 3 8 mmol/L      Chloride 100 mmol/L      CO2 24 mmol/L      ANION GAP 14 mmol/L      BUN 9 mg/dL      Creatinine 0 75 mg/dL      Glucose 139 mg/dL      Calcium 9 3 mg/dL      eGFR 97 ml/min/1 73sq m     Narrative:       [facility] guidelines for Chronic Kidney Disease (CKD):     Stage 1 with normal or high GFR (GFR > 90 mL/min/1 73 square meters)    Stage 2 Mild CKD (GFR = 60-89 mL/min/1 73 square meters)    Stage 3A Moderate CKD (GFR = 45-59 mL/min/1 73 square meters)    Stage 3B Moderate CKD (GFR = 30-44 mL/min/1 73 square meters)    Stage 4 Severe CKD (GFR = 15-29 mL/min/1 73 square meters)    Stage 5 End Stage CKD (GFR <15 mL/min/1 73 square meters)  Note: GFR calculation is accurate only with a steady state creatinine    Lipase [[id number]]  (Normal) Collected:  11/19/19 1940    Lab Status:  Final result Specimen:  Blood from Arm, Right Updated:  11/19/19 2011     Lipase 107 u/L     Hepatic function panel [[id number]]  (Normal) Collected:  11/19/19 1940    Lab Status:  Final result Specimen:  Blood from Arm, Right Updated:  11/19/19 2011     Total Bilirubin 0 55 mg/dL      Bilirubin, Direct 0 17 mg/dL      Alkaline Phosphatase 91 U/L      AST 19 U/L      ALT 27 U/L      Total Protein 8 0 g/dL      Albumin 4 1 g/dL     CBC and differential [[id number]]  (Abnormal) Collected:  11/19/19 1940    Lab Status:  Final result Specimen:  Blood from Arm, Right Updated:  11/19/19 1949     WBC 13 12 Thousand/uL      RBC 4 56 Million/uL      Hemoglobin 15 2 g/dL      Hematocrit 45 6 %       fL      MCH 33 3 pg      MCHC 33 3 g/dL      RDW 12 8 %      MPV 9 8 fL      Platelets 780 Thousands/uL      nRBC 0 /100 WBCs      Neutrophils Relative 85 %      Immat GRANS % 0 %      Lymphocytes Relative 11 %      Monocytes Relative 4 %      Eosinophils Relative 0 %      Basophils Relative 0 %      Neutrophils Absolute 11 05 Thousands/µL      Immature Grans Absolute 0 05 Thousand/uL      Lymphocytes Absolute 1 49 Thousands/µL      Monocytes Absolute 0 50 Thousand/µL      Eosinophils Absolute 0 00 Thousand/µL      Basophils Absolute 0 03 Thousands/µL     POCT urinalysis dipstick [[id number]]  (Abnormal) Resulted:  11/19/19 1947    Lab Status:  Final result Updated:  11/19/19 1947    POCT pregnancy, urine [[id number]]  (Normal) Resulted:  11/19/19 1947    Lab Status:  Final result Updated:  11/19/19 1947     EXT PREG TEST UR (Ref: Negative) NEG (-)     Control VALID    Urine Macroscopic, POC [[id number]]  (Abnormal) Collected:  11/19/19 1942    Lab Status:  Final result Specimen:  Urine Updated:  11/19/19 1944     Color, UA Yellow     Clarity, UA Cloudy     pH, UA 5 5     Leukocytes, UA Negative     Nitrite, UA Negative     Protein,  (2+) mg/dl      Glucose, UA Negative mg/dl      Ketones, UA 40 (2+) mg/dl      Urobilinogen, UA 0 2 E U /dl      Bilirubin, UA Negative     Blood, UA Negative     Specific Gravity, UA >=1 030    Narrative:       CLINITEK RESULT                 CT abdomen pelvis with contrast   ED Interpretation by Carola Dobbins DO (11/19 2053)   IMPRESSION:       No acute intra-abdominal pelvic abnormality identified        Dominant left ovarian follicle           Final Result by Lisha Pop MD (11/19 2048)      No acute intra-abdominal pelvic abnormality identified  Dominant left ovarian follicle              Workstation performed: [id number]                    Procedures  Procedures       ED Course  ED Course as of Nov 19 2130   Tue Nov 19, 2019 1919 Pulse(!): 124   1947 EKG: sinus tachycardia @ 113, normal axis, normal intervals, no sT/ Twave changes      1952 WBC(!): 13 12   1953 Pt meets SIRS criteria, however I do not believe this is infectious or needs abx at this time      2056 Patient reports feeling much improved, pain and nausea have resolved  MDM  Number of Diagnoses or Management Options  Gastroenteritis:   Diagnosis management comments: 50-year-old female with abdominal discomfort, nausea, vomiting, diarrhea- unremarkable ED workup including labs, urine and CT scan  Likely gastroenteritis, possibly viral vs   Food induced  Symptoms completely resolved in the emergency department tolerating p o  Discharged home with prescriptions for Zofran and Pepcid  Instructed to call to establish care with PCP  Return precautions discussed and patient/boyfriend expressed understanding with plan       Amount and/or Complexity of Data Reviewed  Clinical lab tests: ordered and reviewed  Tests in the radiology section of CPT®: ordered and reviewed  Tests in the medicine section of CPT®: ordered and reviewed  Review and summarize past medical records: yes  Independent visualization of images, tracings, or specimens: yes        Disposition  Final diagnoses:   Gastroenteritis     Time reflects when diagnosis was documented in both MDM as applicable and the Disposition within this note     Time User Action Codes Description Comment    11/19/2019  8:56 PM Kassy Bedolla Add [K52 9] Gastroenteritis       ED Disposition     ED Disposition Condition Date/Time Comment    Discharge Stable Tue Nov 19, 2019  8:56 PM Leanne Mccollum discharge to home/self care              Follow-up Information     Follow up With Specialties Details Why Contact Info Additional [address]   [address], [address] [facility] [address]  [address], [address], [address], [address]          Discharge Medication List as of 11/19/2019  8:58 PM      START taking these medications    Details   famotidine (PEPCID) 20 mg tablet Take 1 tablet (20 mg total) by mouth 2 (two) times a day for 10 days, Starting Tue 11/19/2019, Until Fri 11/29/2019, Print         CONTINUE these medications which have CHANGED    Details   ondansetron (ZOFRAN-ODT) 4 mg disintegrating tablet Take 1 tablet (4 mg total) by mouth every 8 (eight) hours as needed for nausea for up to 10 doses, Starting Tue 11/19/2019, Print         CONTINUE these medications which have NOT CHANGED    Details   potassium chloride (K-DUR,KLOR-CON) 10 mEq tablet Take 1 tablet (10 mEq total) by mouth 2 (two) times a day for 2 days, Starting Thu 2/28/2019, Until Sat 3/2/2019, Print           No discharge procedures on file      ED Provider  Electronically Signed by           Katty Cam DO  11/19/19 9127

## 2019-11-20 NOTE — ED NOTES
Patient transported to [address] [place] [address], [address] [facility]  11/19/19 2029

## 2022-02-10 ENCOUNTER — HOSPITAL ENCOUNTER (EMERGENCY)
Facility: HOSPITAL | Age: 47
Discharge: HOME | End: 2022-02-10
Attending: EMERGENCY MEDICINE | Admitting: EMERGENCY MEDICINE

## 2022-02-10 VITALS
DIASTOLIC BLOOD PRESSURE: 86 MMHG | OXYGEN SATURATION: 100 % | RESPIRATION RATE: 18 BRPM | WEIGHT: 149.91 LBS | HEART RATE: 84 BPM | SYSTOLIC BLOOD PRESSURE: 167 MMHG | TEMPERATURE: 98.2 F

## 2022-02-10 DIAGNOSIS — N39.0 UTI (URINARY TRACT INFECTION): Primary | ICD-10-CM

## 2022-02-10 LAB
BACTERIA UR QL AUTO: ABNORMAL /HPF
BILIRUB UR QL STRIP: NEGATIVE
CLARITY UR: CLEAR
COLOR UR: YELLOW
EXT PREG TEST URINE: NORMAL
EXT. CONTROL ED NAV: NORMAL
GLUCOSE UR STRIP-MCNC: NEGATIVE MG/DL
HGB UR QL STRIP.AUTO: ABNORMAL
KETONES UR STRIP-MCNC: ABNORMAL MG/DL
LEUKOCYTE ESTERASE UR QL STRIP: ABNORMAL
NITRITE UR QL STRIP: NEGATIVE
NON-SQ EPI CELLS URNS QL MICRO: ABNORMAL /HPF
PH UR STRIP.AUTO: 6 [PH] (ref 4.5–8)
PROT UR STRIP-MCNC: >=300 MG/DL
RBC #/AREA URNS AUTO: ABNORMAL /HPF
SP GR UR STRIP.AUTO: >=1.03 (ref 1–1.03)
UROBILINOGEN UR QL STRIP.AUTO: 0.2 E.U./DL
WBC #/AREA URNS AUTO: ABNORMAL /HPF

## 2022-02-10 PROCEDURE — 81001 URINALYSIS AUTO W/SCOPE: CPT

## 2022-02-10 PROCEDURE — 81025 URINE PREGNANCY TEST: CPT | Performed by: EMERGENCY MEDICINE

## 2022-02-10 PROCEDURE — 99284 EMERGENCY DEPT VISIT MOD MDM: CPT | Performed by: EMERGENCY MEDICINE

## 2022-02-10 PROCEDURE — 87086 URINE CULTURE/COLONY COUNT: CPT

## 2022-02-10 PROCEDURE — 99283 EMERGENCY DEPT VISIT LOW MDM: CPT

## 2022-02-10 RX ORDER — CEPHALEXIN 500 MG/1
500 CAPSULE ORAL EVERY 6 HOURS SCHEDULED
Qty: 20 CAPSULE | Refills: 0 | Status: SHIPPED | OUTPATIENT
Start: 2022-02-10 | End: 2022-02-15

## 2022-02-10 NOTE — ED PROVIDER NOTES
History  Chief Complaint   Patient presents with    Painful Urination     Pt reports burning with urination beginning yesterday and blood in urine today  Denies fever or back pain  58-year-old female with dysuria since yesterday  She states that she saw a little bit of blood in her urine also  LMP was last week  No fevers, no nausea/vomiting, no abdominal pain, no back pain          Prior to Admission Medications   Prescriptions Last Dose Informant Patient Reported? Taking?   famotidine (PEPCID) 20 mg tablet   No No   Sig: Take 1 tablet (20 mg total) by mouth 2 (two) times a day for 10 days   ondansetron (ZOFRAN-ODT) 4 mg disintegrating tablet   No No   Sig: Take 1 tablet (4 mg total) by mouth every 8 (eight) hours as needed for nausea for up to 10 doses   potassium chloride (K-DUR,KLOR-CON) 10 mEq tablet   No No   Sig: Take 1 tablet (10 mEq total) by mouth 2 (two) times a day for 2 days      Facility-Administered Medications: None       History reviewed  No pertinent past medical history  Past Surgical History:   Procedure Laterality Date    TONSILLECTOMY         History reviewed  No pertinent family history  I have reviewed and agree with the history as documented  E-Cigarette/Vaping     E-Cigarette/Vaping Substances     Social History     Tobacco Use    Smoking status: Current Every Day Smoker     Packs/day: 0 50     Types: Cigarettes    Smokeless tobacco: Never Used   Substance Use Topics    Alcohol use: Yes    Drug use: No       Review of Systems   Constitutional: Negative for fatigue and fever  HENT: Negative for rhinorrhea and sore throat  Respiratory: Negative for cough and shortness of breath  Cardiovascular: Negative for chest pain and leg swelling  Gastrointestinal: Negative for abdominal pain, diarrhea and vomiting  Genitourinary: Positive for dysuria  Negative for difficulty urinating and flank pain  Musculoskeletal: Negative for back pain and myalgias     Skin: Negative for color change and rash  Neurological: Negative for dizziness, syncope and headaches  Physical Exam  Physical Exam  Vitals and nursing note reviewed  Constitutional:       Appearance: She is well-developed  HENT:      Head: Normocephalic and atraumatic  Right Ear: External ear normal       Left Ear: External ear normal    Eyes:      General: No scleral icterus  Extraocular Movements: Extraocular movements intact  Cardiovascular:      Rate and Rhythm: Normal rate and regular rhythm  Pulmonary:      Effort: Pulmonary effort is normal  No respiratory distress  Breath sounds: Normal breath sounds  Abdominal:      Palpations: Abdomen is soft  Tenderness: There is no abdominal tenderness  Musculoskeletal:         General: No deformity or signs of injury  Normal range of motion  Cervical back: Normal range of motion and neck supple  Skin:     General: Skin is warm and dry  Coloration: Skin is not jaundiced or pale  Neurological:      General: No focal deficit present  Mental Status: She is alert and oriented to person, place, and time     Psychiatric:         Mood and Affect: Mood normal          Behavior: Behavior normal          Vital Signs  ED Triage Vitals [02/10/22 1454]   Temperature Pulse Respirations Blood Pressure SpO2   98 2 °F (36 8 °C) 84 18 167/86 100 %      Temp Source Heart Rate Source Patient Position - Orthostatic VS BP Location FiO2 (%)   Oral Monitor Sitting Right arm --      Pain Score       No Pain           Vitals:    02/10/22 1454   BP: 167/86   Pulse: 84   Patient Position - Orthostatic VS: Sitting         Visual Acuity      ED Medications  Medications - No data to display    Diagnostic Studies  Results Reviewed     Procedure Component Value Units Date/Time    Urine Microscopic [[id number]]  (Abnormal) Collected: 02/10/22 8904    Lab Status: Final result Specimen: Urine, Clean Catch Updated: 02/10/22 1713     RBC, UA 20-30 /hpf WBC, UA Innumerable /hpf      Epithelial Cells Occasional /hpf      Bacteria, UA Innumerable /hpf     Urine culture [[id number]] Collected: 02/10/22 1554    Lab Status: In process Specimen: Urine, Clean Catch Updated: 02/10/22 1713    POCT pregnancy, urine [[id number]]  (Normal) Resulted: 02/10/22 1557    Lab Status: Final result Updated: 02/10/22 1557     EXT PREG TEST UR (Ref: Negative) NEgative (-)     Control Valid    Urine Macroscopic, POC [[id number]]  (Abnormal) Collected: 02/10/22 1554    Lab Status: Final result Specimen: Urine Updated: 02/10/22 1556     Color, UA Yellow     Clarity, UA Clear     pH, UA 6 0     Leukocytes, UA Large     Nitrite, UA Negative     Protein, UA >=300 mg/dl      Glucose, UA Negative mg/dl      Ketones, UA Trace mg/dl      Urobilinogen, UA 0 2 E U /dl      Bilirubin, UA Negative     Blood, UA Large     Specific Gravity, UA >=1 030    Narrative:      CLINITEK RESULT                 No orders to display              Procedures  Procedures         ED Course                                             MDM  Number of Diagnoses or Management Options     Amount and/or Complexity of Data Reviewed  Clinical lab tests: ordered and reviewed    Risk of Complications, Morbidity, and/or Mortality  Presenting problems: moderate        Disposition  Final diagnoses:   UTI (urinary tract infection)     Time reflects when diagnosis was documented in both MDM as applicable and the Disposition within this note     Time User Action Codes Description Comment    2/10/2022  3:40 PM Jhon Ferro Add [N39 0] UTI (urinary tract infection)       ED Disposition     ED Disposition Condition Date/Time Comment    Discharge Stable u Feb 10, 2022  3:40 PM Linus Stokes discharge to home/self care              Follow-up Information     Follow up With Specialties Details Why Contact Info Additional [address]   [address], [address] [address] [facility], [address] [place] [address], [address] [place], [place], [address]          Discharge Medication List as of 2/10/2022  3:41 PM      START taking these medications    Details   cephalexin (KEFLEX) 500 mg capsule Take 1 capsule (500 mg total) by mouth every 6 (six) hours for 5 days, Starting Thu 2/10/2022, Until Tue 2/15/2022, Normal         CONTINUE these medications which have NOT CHANGED    Details   famotidine (PEPCID) 20 mg tablet Take 1 tablet (20 mg total) by mouth 2 (two) times a day for 10 days, Starting Tue 11/19/2019, Until Fri 11/29/2019, Print      ondansetron (ZOFRAN-ODT) 4 mg disintegrating tablet Take 1 tablet (4 mg total) by mouth every 8 (eight) hours as needed for nausea for up to 10 doses, Starting Tue 11/19/2019, Print      potassium chloride (K-DUR,KLOR-CON) 10 mEq tablet Take 1 tablet (10 mEq total) by mouth 2 (two) times a day for 2 days, Starting Thu 2/28/2019, Until Sat 3/2/2019, Print             No discharge procedures on file      PDMP Review     None          ED Provider  Electronically Signed by           Skip Remy MD  02/10/22 0060

## 2022-02-11 LAB — BACTERIA UR CULT: NORMAL

## 2022-06-15 ENCOUNTER — APPOINTMENT (EMERGENCY)
Dept: CT IMAGING | Facility: HOSPITAL | Age: 47
End: 2022-06-15

## 2022-06-15 ENCOUNTER — HOSPITAL ENCOUNTER (EMERGENCY)
Facility: HOSPITAL | Age: 47
Discharge: HOME | End: 2022-06-15
Attending: EMERGENCY MEDICINE

## 2022-06-15 VITALS
DIASTOLIC BLOOD PRESSURE: 94 MMHG | HEART RATE: 95 BPM | RESPIRATION RATE: 16 BRPM | WEIGHT: 140.21 LBS | OXYGEN SATURATION: 98 % | SYSTOLIC BLOOD PRESSURE: 174 MMHG | TEMPERATURE: 99 F

## 2022-06-15 DIAGNOSIS — R11.2 NAUSEA AND VOMITING: Primary | ICD-10-CM

## 2022-06-15 LAB
ALBUMIN SERPL BCP-MCNC: 4.3 G/DL (ref 3.5–5)
ALP SERPL-CCNC: 94 U/L (ref 46–116)
ALT SERPL W P-5'-P-CCNC: 34 U/L (ref 12–78)
ANION GAP SERPL CALCULATED.3IONS-SCNC: 10 MMOL/L (ref 4–13)
AST SERPL W P-5'-P-CCNC: 17 U/L (ref 5–45)
BACTERIA UR QL AUTO: ABNORMAL /HPF
BASOPHILS # BLD AUTO: 0.04 THOUSANDS/ΜL (ref 0–0.1)
BASOPHILS NFR BLD AUTO: 0 % (ref 0–1)
BILIRUB SERPL-MCNC: 0.59 MG/DL (ref 0.2–1)
BILIRUB UR QL STRIP: NEGATIVE
BUN SERPL-MCNC: 6 MG/DL (ref 5–25)
CALCIUM SERPL-MCNC: 9.2 MG/DL (ref 8.3–10.1)
CHLORIDE SERPL-SCNC: 98 MMOL/L (ref 100–108)
CLARITY UR: CLEAR
CO2 SERPL-SCNC: 28 MMOL/L (ref 21–32)
COLOR UR: YELLOW
CREAT SERPL-MCNC: 0.86 MG/DL (ref 0.6–1.3)
EOSINOPHIL # BLD AUTO: 0 THOUSAND/ΜL (ref 0–0.61)
EOSINOPHIL NFR BLD AUTO: 0 % (ref 0–6)
ERYTHROCYTE [DISTWIDTH] IN BLOOD BY AUTOMATED COUNT: 12.6 % (ref 11.6–15.1)
EXT PREG TEST URINE: NEGATIVE
EXT. CONTROL ED NAV: NORMAL
GFR SERPL CREATININE-BSD FRML MDRD: 80 ML/MIN/1.73SQ M
GLUCOSE SERPL-MCNC: 157 MG/DL (ref 65–140)
GLUCOSE UR STRIP-MCNC: NEGATIVE MG/DL
HCT VFR BLD AUTO: 44.8 % (ref 34.8–46.1)
HGB BLD-MCNC: 15.7 G/DL (ref 11.5–15.4)
HGB UR QL STRIP.AUTO: ABNORMAL
IMM GRANULOCYTES # BLD AUTO: 0.07 THOUSAND/UL (ref 0–0.2)
IMM GRANULOCYTES NFR BLD AUTO: 1 % (ref 0–2)
KETONES UR STRIP-MCNC: ABNORMAL MG/DL
LEUKOCYTE ESTERASE UR QL STRIP: NEGATIVE
LIPASE SERPL-CCNC: 58 U/L (ref 73–393)
LYMPHOCYTES # BLD AUTO: 1.41 THOUSANDS/ΜL (ref 0.6–4.47)
LYMPHOCYTES NFR BLD AUTO: 11 % (ref 14–44)
MCH RBC QN AUTO: 33.6 PG (ref 26.8–34.3)
MCHC RBC AUTO-ENTMCNC: 35 G/DL (ref 31.4–37.4)
MCV RBC AUTO: 96 FL (ref 82–98)
MONOCYTES # BLD AUTO: 0.68 THOUSAND/ΜL (ref 0.17–1.22)
MONOCYTES NFR BLD AUTO: 5 % (ref 4–12)
MUCOUS THREADS UR QL AUTO: ABNORMAL
NEUTROPHILS # BLD AUTO: 11.21 THOUSANDS/ΜL (ref 1.85–7.62)
NEUTS SEG NFR BLD AUTO: 83 % (ref 43–75)
NITRITE UR QL STRIP: NEGATIVE
NON-SQ EPI CELLS URNS QL MICRO: ABNORMAL /HPF
NRBC BLD AUTO-RTO: 0 /100 WBCS
PH UR STRIP.AUTO: 6.5 [PH] (ref 4.5–8)
PLATELET # BLD AUTO: 346 THOUSANDS/UL (ref 149–390)
PMV BLD AUTO: 9.8 FL (ref 8.9–12.7)
POTASSIUM SERPL-SCNC: 3.1 MMOL/L (ref 3.5–5.3)
PROT SERPL-MCNC: 8.3 G/DL (ref 6.4–8.2)
PROT UR STRIP-MCNC: >=300 MG/DL
RBC # BLD AUTO: 4.67 MILLION/UL (ref 3.81–5.12)
RBC #/AREA URNS AUTO: ABNORMAL /HPF
SODIUM SERPL-SCNC: 136 MMOL/L (ref 136–145)
SP GR UR STRIP.AUTO: >=1.03 (ref 1–1.03)
UROBILINOGEN UR QL STRIP.AUTO: 0.2 E.U./DL
WBC # BLD AUTO: 13.41 THOUSAND/UL (ref 4.31–10.16)
WBC #/AREA URNS AUTO: ABNORMAL /HPF

## 2022-06-15 PROCEDURE — 96375 TX/PRO/DX INJ NEW DRUG ADDON: CPT

## 2022-06-15 PROCEDURE — 96376 TX/PRO/DX INJ SAME DRUG ADON: CPT

## 2022-06-15 PROCEDURE — 99284 EMERGENCY DEPT VISIT MOD MDM: CPT

## 2022-06-15 PROCEDURE — 85025 COMPLETE CBC W/AUTO DIFF WBC: CPT | Performed by: EMERGENCY MEDICINE

## 2022-06-15 PROCEDURE — 96361 HYDRATE IV INFUSION ADD-ON: CPT

## 2022-06-15 PROCEDURE — 80053 COMPREHEN METABOLIC PANEL: CPT | Performed by: EMERGENCY MEDICINE

## 2022-06-15 PROCEDURE — 81001 URINALYSIS AUTO W/SCOPE: CPT

## 2022-06-15 PROCEDURE — 99284 EMERGENCY DEPT VISIT MOD MDM: CPT | Performed by: EMERGENCY MEDICINE

## 2022-06-15 PROCEDURE — 74176 CT ABD & PELVIS W/O CONTRAST: CPT

## 2022-06-15 PROCEDURE — 83690 ASSAY OF LIPASE: CPT | Performed by: EMERGENCY MEDICINE

## 2022-06-15 PROCEDURE — G1004 CDSM NDSC: HCPCS

## 2022-06-15 PROCEDURE — 96374 THER/PROPH/DIAG INJ IV PUSH: CPT

## 2022-06-15 PROCEDURE — 81025 URINE PREGNANCY TEST: CPT | Performed by: EMERGENCY MEDICINE

## 2022-06-15 PROCEDURE — 36415 COLL VENOUS BLD VENIPUNCTURE: CPT | Performed by: EMERGENCY MEDICINE

## 2022-06-15 RX ORDER — POTASSIUM CHLORIDE 20MEQ/15ML
40 LIQUID (ML) ORAL ONCE
Status: COMPLETED | OUTPATIENT
Start: 2022-06-15 | End: 2022-06-15

## 2022-06-15 RX ORDER — ACETAMINOPHEN 325 MG/1
975 TABLET ORAL ONCE
Status: COMPLETED | OUTPATIENT
Start: 2022-06-15 | End: 2022-06-15

## 2022-06-15 RX ORDER — OMEPRAZOLE 20 MG/1
20 CAPSULE, DELAYED RELEASE ORAL DAILY
Qty: 20 CAPSULE | Refills: 0 | Status: SHIPPED | OUTPATIENT
Start: 2022-06-15

## 2022-06-15 RX ORDER — FAMOTIDINE 10 MG/ML
20 INJECTION, SOLUTION INTRAVENOUS ONCE
Status: COMPLETED | OUTPATIENT
Start: 2022-06-15 | End: 2022-06-15

## 2022-06-15 RX ORDER — ONDANSETRON 2 MG/ML
4 INJECTION INTRAMUSCULAR; INTRAVENOUS ONCE
Status: COMPLETED | OUTPATIENT
Start: 2022-06-15 | End: 2022-06-15

## 2022-06-15 RX ORDER — KETOROLAC TROMETHAMINE 30 MG/ML
30 INJECTION, SOLUTION INTRAMUSCULAR; INTRAVENOUS ONCE
Status: COMPLETED | OUTPATIENT
Start: 2022-06-15 | End: 2022-06-15

## 2022-06-15 RX ORDER — ONDANSETRON 4 MG/1
4 TABLET, FILM COATED ORAL EVERY 8 HOURS PRN
Qty: 12 TABLET | Refills: 0 | Status: SHIPPED | OUTPATIENT
Start: 2022-06-15

## 2022-06-15 RX ADMIN — ONDANSETRON 4 MG: 2 INJECTION INTRAMUSCULAR; INTRAVENOUS at 19:17

## 2022-06-15 RX ADMIN — FAMOTIDINE 20 MG: 10 INJECTION INTRAVENOUS at 17:01

## 2022-06-15 RX ADMIN — ONDANSETRON 4 MG: 2 INJECTION INTRAMUSCULAR; INTRAVENOUS at 17:01

## 2022-06-15 RX ADMIN — POTASSIUM CHLORIDE 40 MEQ: 20 SOLUTION ORAL at 19:17

## 2022-06-15 RX ADMIN — SODIUM CHLORIDE 1000 ML: 0.9 INJECTION, SOLUTION INTRAVENOUS at 17:01

## 2022-06-15 RX ADMIN — ACETAMINOPHEN 975 MG: 325 TABLET, FILM COATED ORAL at 19:17

## 2022-06-15 RX ADMIN — KETOROLAC TROMETHAMINE 30 MG: 30 INJECTION, SOLUTION INTRAMUSCULAR; INTRAVENOUS at 17:39

## 2022-06-15 NOTE — ED PROVIDER NOTES
History  Chief Complaint   Patient presents with    Vomiting     Vomiting since last night - having chills - pepto this morning with no relief      54-year-old female with vomiting since last night  She took Pepto this morning with no relief  No constipation or diarrhea  No fevers, no urinary symptoms  Patient does not have a history of high blood pressure and her blood pressure is 210/125 upon arrival   Recheck is 200/102    No chest pain, no headache          Prior to Admission Medications   Prescriptions Last Dose Informant Patient Reported? Taking?   famotidine (PEPCID) 20 mg tablet   No No   Sig: Take 1 tablet (20 mg total) by mouth 2 (two) times a day for 10 days   ondansetron (ZOFRAN-ODT) 4 mg disintegrating tablet   No No   Sig: Take 1 tablet (4 mg total) by mouth every 8 (eight) hours as needed for nausea for up to 10 doses   potassium chloride (K-DUR,KLOR-CON) 10 mEq tablet   No No   Sig: Take 1 tablet (10 mEq total) by mouth 2 (two) times a day for 2 days      Facility-Administered Medications: None       History reviewed  No pertinent past medical history  Past Surgical History:   Procedure Laterality Date    TONSILLECTOMY         History reviewed  No pertinent family history  I have reviewed and agree with the history as documented  E-Cigarette/Vaping     E-Cigarette/Vaping Substances     Social History     Tobacco Use    Smoking status: Current Every Day Smoker     Packs/day: 0 50     Types: Cigarettes    Smokeless tobacco: Never Used   Substance Use Topics    Alcohol use: Yes    Drug use: No       Review of Systems   Constitutional: Negative for appetite change, fatigue and fever  HENT: Negative for rhinorrhea and sore throat  Eyes: Negative for pain  Respiratory: Negative for cough, shortness of breath and wheezing  Cardiovascular: Negative for chest pain and leg swelling  Gastrointestinal: Positive for abdominal pain, nausea and vomiting  Negative for diarrhea  Genitourinary: Negative for dysuria and flank pain  Musculoskeletal: Negative for back pain and neck pain  Skin: Negative for rash  Neurological: Negative for syncope and headaches  Psychiatric/Behavioral:        Mood normal       Physical Exam  Physical Exam  Vitals and nursing note reviewed  Constitutional:       Appearance: She is well-developed  HENT:      Head: Normocephalic and atraumatic  Right Ear: External ear normal       Left Ear: External ear normal    Eyes:      General: No scleral icterus  Extraocular Movements: Extraocular movements intact  Cardiovascular:      Rate and Rhythm: Normal rate and regular rhythm  Pulmonary:      Effort: Pulmonary effort is normal  No respiratory distress  Breath sounds: Normal breath sounds  Abdominal:      Palpations: Abdomen is soft  Comments: Epigastric tenderness, no rebound/guarding   Musculoskeletal:         General: No deformity or signs of injury  Normal range of motion  Cervical back: Normal range of motion and neck supple  Skin:     General: Skin is warm and dry  Coloration: Skin is not jaundiced or pale  Neurological:      General: No focal deficit present  Mental Status: She is alert and oriented to person, place, and time     Psychiatric:         Mood and Affect: Mood normal          Behavior: Behavior normal          Vital Signs  ED Triage Vitals   Temperature Pulse Respirations Blood Pressure SpO2   06/15/22 1558 06/15/22 1558 06/15/22 1558 06/15/22 1600 06/15/22 1558   99 °F (37 2 °C) (!) 120 16 (S) (!) 210/125 98 %      Temp Source Heart Rate Source Patient Position - Orthostatic VS BP Location FiO2 (%)   06/15/22 1558 06/15/22 1558 06/15/22 1742 06/15/22 1742 --   Oral Monitor Lying Left arm       Pain Score       06/15/22 1742       No Pain           Vitals:    06/15/22 1558 06/15/22 1600 06/15/22 1742   BP:  (S) (!) 210/125 (!) 174/94   Pulse: (!) 120  95   Patient Position - Orthostatic VS: Lying         Visual Acuity      ED Medications  Medications   sodium chloride 0 9 % bolus 1,000 mL (0 mL Intravenous Stopped 6/15/22 1916)   ondansetron (ZOFRAN) injection 4 mg (4 mg Intravenous Given 6/15/22 1701)   Famotidine (PF) (PEPCID) injection 20 mg (20 mg Intravenous Given 6/15/22 1701)   ketorolac (TORADOL) injection 30 mg (30 mg Intravenous Given 6/15/22 1739)   potassium chloride oral solution 40 mEq (40 mEq Oral Given 6/15/22 1917)   ondansetron (ZOFRAN) injection 4 mg (4 mg Intravenous Given 6/15/22 1917)   acetaminophen (TYLENOL) tablet 975 mg (975 mg Oral Given 6/15/22 1917)       Diagnostic Studies  Results Reviewed     Procedure Component Value Units Date/Time    Urine Microscopic [[id number]]  (Abnormal) Collected: 06/15/22 1730    Lab Status: Final result Specimen: Urine, Clean Catch Updated: 06/15/22 1813     RBC, UA 1-2 /hpf      WBC, UA 2-4 /hpf      Epithelial Cells Moderate /hpf      Bacteria, UA Moderate /hpf      MUCUS THREADS Occasional    POCT pregnancy, urine [[id number]]  (Normal) Resulted: 06/15/22 1734    Lab Status: Final result Updated: 06/15/22 1734     EXT PREG TEST UR (Ref: Negative) negative     Control valid    Urine Macroscopic, POC [[id number]]  (Abnormal) Collected: 06/15/22 1730    Lab Status: Final result Specimen: Urine Updated: 06/15/22 1734     Color, UA Yellow     Clarity, UA Clear     pH, UA 6 5     Leukocytes, UA Negative     Nitrite, UA Negative     Protein, UA >=300 mg/dl      Glucose, UA Negative mg/dl      Ketones, UA 40 (2+) mg/dl      Urobilinogen, UA 0 2 E U /dl      Bilirubin, UA Negative     Blood, UA Small     Specific [place], UA >=1 030    Narrative:      CLINITEK RESULT    Lipase [[id number]]  (Abnormal) Collected: 06/15/22 1700    Lab Status: Final result Specimen: Blood from Arm, Right Updated: 06/15/22 1732     Lipase 58 u/L     Comprehensive metabolic panel [[id number]]  (Abnormal) Collected: 06/15/22 1700    Lab Status: Final result Specimen: Blood from Arm, Right Updated: 06/15/22 1732     Sodium 136 mmol/L      Potassium 3 1 mmol/L      Chloride 98 mmol/L      CO2 28 mmol/L      ANION GAP 10 mmol/L      BUN 6 mg/dL      Creatinine 0 86 mg/dL      Glucose 157 mg/dL      Calcium 9 2 mg/dL      AST 17 U/L      ALT 34 U/L      Alkaline Phosphatase 94 U/L      Total Protein 8 3 g/dL      Albumin 4 3 g/dL      Total Bilirubin 0 59 mg/dL      eGFR 80 ml/min/1 73sq m     Narrative:      National Kidney Disease Foundation guidelines for Chronic Kidney Disease (CKD):     Stage 1 with normal or high GFR (GFR > 90 mL/min/1 73 square meters)    Stage 2 Mild CKD (GFR = 60-89 mL/min/1 73 square meters)    Stage 3A Moderate CKD (GFR = 45-59 mL/min/1 73 square meters)    Stage 3B Moderate CKD (GFR = 30-44 mL/min/1 73 square meters)    Stage 4 Severe CKD (GFR = 15-29 mL/min/1 73 square meters)    Stage 5 End Stage CKD (GFR <15 mL/min/1 73 square meters)  Note: GFR calculation is accurate only with a steady state creatinine    CBC and differential [[id number]]  (Abnormal) Collected: 06/15/22 1700    Lab Status: Final result Specimen: Blood from Arm, Right Updated: 06/15/22 1714     WBC 13 41 Thousand/uL      RBC 4 67 Million/uL      Hemoglobin 15 7 g/dL      Hematocrit 44 8 %      MCV 96 fL      MCH 33 6 pg      MCHC 35 0 g/dL      RDW 12 6 %      MPV 9 8 fL      Platelets 192 Thousands/uL      nRBC 0 /100 WBCs      Neutrophils Relative 83 %      Immat GRANS % 1 %      Lymphocytes Relative 11 %      Monocytes Relative 5 %      Eosinophils Relative 0 %      Basophils Relative 0 %      Neutrophils Absolute 11 21 Thousands/µL      Immature Grans Absolute 0 07 Thousand/uL      Lymphocytes Absolute 1 41 Thousands/µL      Monocytes Absolute 0 68 Thousand/µL      Eosinophils Absolute 0 00 Thousand/µL      Basophils Absolute 0 04 Thousands/µL                  CT abdomen pelvis wo contrast   Final Result by Mahendra De La Cruz MD (06/15 1828)      No acute abdominopelvic pathology Workstation performed: [id number]                    Procedures  Procedures         ED Course                                             MDM  Number of Diagnoses or Management Options     Amount and/or Complexity of Data Reviewed  Clinical lab tests: ordered and reviewed    Risk of Complications, Morbidity, and/or Mortality  Presenting problems: moderate  General comments: Patient felt better in the ER  She is stable for outpatient follow-up tolerated lorena Garcia 's        Disposition  Final diagnoses:   Nausea and vomiting     Time reflects when diagnosis was documented in both MDM as applicable and the Disposition within this note     Time User Action Codes Description Comment    6/15/2022  6:45 PM Katlyn Ferro 6 [R11 2] Nausea and vomiting       ED Disposition     ED Disposition   Discharge    Condition   Stable    Date/Time   Wed Sidney 15, 2022  6:45 PM    Comment   Michael Castro discharge to home/self care                 Follow-up Information     Follow up With Specialties Details Why Contact Info Additional [address]   [address], [address] [facility] [address]  [address], [address], [address] [place], [place], [address]          Discharge Medication List as of 6/15/2022  6:46 PM      START taking these medications    Details   omeprazole (PriLOSEC) 20 mg delayed release capsule Take 1 capsule (20 mg total) by mouth daily, Starting Wed 6/15/2022, Normal      ondansetron (ZOFRAN) 4 mg tablet Take 1 tablet (4 mg total) by mouth every 8 (eight) hours as needed for nausea, Starting Wed 6/15/2022, Normal         CONTINUE these medications which have NOT CHANGED    Details   famotidine (PEPCID) 20 mg tablet Take 1 tablet (20 mg total) by mouth 2 (two) times a day for 10 days, Starting Tue 11/19/2019, Until Fri 11/29/2019, Print ondansetron (ZOFRAN-ODT) 4 mg disintegrating tablet Take 1 tablet (4 mg total) by mouth every 8 (eight) hours as needed for nausea for up to 10 doses, Starting Tue 11/19/2019, Print      potassium chloride (K-DUR,KLOR-CON) 10 mEq tablet Take 1 tablet (10 mEq total) by mouth 2 (two) times a day for 2 days, Starting u 2/28/2019, Until Sat 3/2/2019, Print             No discharge procedures on file      PDMP Review     None          ED Provider  Electronically Signed by           Denisse Alonzo MD  06/16/22 7038

## 2023-02-18 ENCOUNTER — HOSPITAL ENCOUNTER (EMERGENCY)
Facility: HOSPITAL | Age: 48
Discharge: HOME | End: 2023-02-18
Attending: EMERGENCY MEDICINE

## 2023-02-18 ENCOUNTER — APPOINTMENT (EMERGENCY)
Dept: CT IMAGING | Facility: HOSPITAL | Age: 48
End: 2023-02-18

## 2023-02-18 VITALS
SYSTOLIC BLOOD PRESSURE: 177 MMHG | RESPIRATION RATE: 16 BRPM | HEART RATE: 76 BPM | OXYGEN SATURATION: 99 % | TEMPERATURE: 99.2 F | DIASTOLIC BLOOD PRESSURE: 87 MMHG | WEIGHT: 149.03 LBS

## 2023-02-18 DIAGNOSIS — E87.6 HYPOKALEMIA: ICD-10-CM

## 2023-02-18 DIAGNOSIS — R19.7 NAUSEA VOMITING AND DIARRHEA: Primary | ICD-10-CM

## 2023-02-18 DIAGNOSIS — R10.13 EPIGASTRIC ABDOMINAL PAIN: ICD-10-CM

## 2023-02-18 DIAGNOSIS — R11.2 NAUSEA VOMITING AND DIARRHEA: Primary | ICD-10-CM

## 2023-02-18 LAB
ALBUMIN SERPL BCP-MCNC: 4.5 G/DL (ref 3.5–5)
ALP SERPL-CCNC: 66 U/L (ref 34–104)
ALT SERPL W P-5'-P-CCNC: 43 U/L (ref 7–52)
ANION GAP SERPL CALCULATED.3IONS-SCNC: 8 MMOL/L (ref 4–13)
AST SERPL W P-5'-P-CCNC: 45 U/L (ref 13–39)
BACTERIA UR QL AUTO: ABNORMAL /HPF
BASOPHILS # BLD AUTO: 0.03 THOUSANDS/ÂΜL (ref 0–0.1)
BASOPHILS NFR BLD AUTO: 0 % (ref 0–1)
BILIRUB SERPL-MCNC: 0.43 MG/DL (ref 0.2–1)
BILIRUB UR QL STRIP: NEGATIVE
BUN SERPL-MCNC: 9 MG/DL (ref 5–25)
CALCIUM SERPL-MCNC: 8.5 MG/DL (ref 8.4–10.2)
CHLORIDE SERPL-SCNC: 105 MMOL/L (ref 96–108)
CLARITY UR: ABNORMAL
CO2 SERPL-SCNC: 26 MMOL/L (ref 21–32)
COLOR UR: ABNORMAL
CREAT SERPL-MCNC: 0.71 MG/DL (ref 0.6–1.3)
EOSINOPHIL # BLD AUTO: 0.03 THOUSAND/ÂΜL (ref 0–0.61)
EOSINOPHIL NFR BLD AUTO: 0 % (ref 0–6)
ERYTHROCYTE [DISTWIDTH] IN BLOOD BY AUTOMATED COUNT: 12.8 % (ref 11.6–15.1)
EXT PREGNANCY TEST URINE: NEGATIVE
EXT. CONTROL: NORMAL
GFR SERPL CREATININE-BSD FRML MDRD: 101 ML/MIN/1.73SQ M
GLUCOSE SERPL-MCNC: 159 MG/DL (ref 65–140)
GLUCOSE UR STRIP-MCNC: NEGATIVE MG/DL
HCT VFR BLD AUTO: 40.1 % (ref 34.8–46.1)
HGB BLD-MCNC: 13.7 G/DL (ref 11.5–15.4)
HGB UR QL STRIP.AUTO: ABNORMAL
IMM GRANULOCYTES # BLD AUTO: 0.03 THOUSAND/UL (ref 0–0.2)
IMM GRANULOCYTES NFR BLD AUTO: 0 % (ref 0–2)
KETONES UR STRIP-MCNC: ABNORMAL MG/DL
LEUKOCYTE ESTERASE UR QL STRIP: ABNORMAL
LIPASE SERPL-CCNC: 27 U/L (ref 11–82)
LYMPHOCYTES # BLD AUTO: 0.89 THOUSANDS/ÂΜL (ref 0.6–4.47)
LYMPHOCYTES NFR BLD AUTO: 12 % (ref 14–44)
MCH RBC QN AUTO: 33.7 PG (ref 26.8–34.3)
MCHC RBC AUTO-ENTMCNC: 34.2 G/DL (ref 31.4–37.4)
MCV RBC AUTO: 99 FL (ref 82–98)
MONOCYTES # BLD AUTO: 0.38 THOUSAND/ÂΜL (ref 0.17–1.22)
MONOCYTES NFR BLD AUTO: 5 % (ref 4–12)
NEUTROPHILS # BLD AUTO: 5.79 THOUSANDS/ÂΜL (ref 1.85–7.62)
NEUTS SEG NFR BLD AUTO: 83 % (ref 43–75)
NITRITE UR QL STRIP: NEGATIVE
NON-SQ EPI CELLS URNS QL MICRO: ABNORMAL /HPF
NRBC BLD AUTO-RTO: 0 /100 WBCS
PH UR STRIP.AUTO: 6 [PH] (ref 4.5–8)
PLATELET # BLD AUTO: 305 THOUSANDS/UL (ref 149–390)
PMV BLD AUTO: 9.5 FL (ref 8.9–12.7)
POTASSIUM SERPL-SCNC: 3.1 MMOL/L (ref 3.5–5.3)
PROT SERPL-MCNC: 7.2 G/DL (ref 6.4–8.4)
PROT UR STRIP-MCNC: ABNORMAL MG/DL
RBC # BLD AUTO: 4.06 MILLION/UL (ref 3.81–5.12)
RBC #/AREA URNS AUTO: ABNORMAL /HPF
SODIUM SERPL-SCNC: 139 MMOL/L (ref 135–147)
SP GR UR STRIP.AUTO: >=1.03 (ref 1–1.03)
UROBILINOGEN UR QL STRIP.AUTO: 0.2 E.U./DL
WBC # BLD AUTO: 7.15 THOUSAND/UL (ref 4.31–10.16)
WBC #/AREA URNS AUTO: ABNORMAL /HPF

## 2023-02-18 RX ORDER — ONDANSETRON 2 MG/ML
4 INJECTION INTRAMUSCULAR; INTRAVENOUS ONCE
Status: COMPLETED | OUTPATIENT
Start: 2023-02-18 | End: 2023-02-18

## 2023-02-18 RX ORDER — FAMOTIDINE 10 MG/ML
20 INJECTION, SOLUTION INTRAVENOUS DAILY
Status: DISCONTINUED | OUTPATIENT
Start: 2023-02-18 | End: 2023-02-18 | Stop reason: HOSPADM

## 2023-02-18 RX ORDER — ONDANSETRON 4 MG/1
4 TABLET, ORALLY DISINTEGRATING ORAL EVERY 6 HOURS PRN
Qty: 20 TABLET | Refills: 0 | Status: SHIPPED | OUTPATIENT
Start: 2023-02-18

## 2023-02-18 RX ORDER — POTASSIUM CHLORIDE 20 MEQ/1
20 TABLET, EXTENDED RELEASE ORAL 2 TIMES DAILY
Qty: 10 TABLET | Refills: 0 | Status: SHIPPED | OUTPATIENT
Start: 2023-02-18 | End: 2023-02-23

## 2023-02-18 RX ORDER — METOCLOPRAMIDE HYDROCHLORIDE 5 MG/ML
10 INJECTION INTRAMUSCULAR; INTRAVENOUS ONCE
Status: COMPLETED | OUTPATIENT
Start: 2023-02-18 | End: 2023-02-18

## 2023-02-18 RX ORDER — DIPHENHYDRAMINE HYDROCHLORIDE 50 MG/ML
12.5 INJECTION INTRAMUSCULAR; INTRAVENOUS ONCE
Status: COMPLETED | OUTPATIENT
Start: 2023-02-18 | End: 2023-02-18

## 2023-02-18 RX ORDER — POTASSIUM CHLORIDE 20 MEQ/1
40 TABLET, EXTENDED RELEASE ORAL ONCE
Status: DISCONTINUED | OUTPATIENT
Start: 2023-02-18 | End: 2023-02-18 | Stop reason: HOSPADM

## 2023-02-18 RX ADMIN — MORPHINE SULFATE 2 MG: 2 INJECTION, SOLUTION INTRAMUSCULAR; INTRAVENOUS at 17:11

## 2023-02-18 RX ADMIN — FAMOTIDINE 20 MG: 10 INJECTION, SOLUTION INTRAVENOUS at 15:37

## 2023-02-18 RX ADMIN — METOCLOPRAMIDE 10 MG: 5 INJECTION, SOLUTION INTRAMUSCULAR; INTRAVENOUS at 18:04

## 2023-02-18 RX ADMIN — ONDANSETRON 4 MG: 2 INJECTION INTRAMUSCULAR; INTRAVENOUS at 15:36

## 2023-02-18 RX ADMIN — IOHEXOL 100 ML: 350 INJECTION, SOLUTION INTRAVENOUS at 16:29

## 2023-02-18 RX ADMIN — DIPHENHYDRAMINE HYDROCHLORIDE 12.5 MG: 50 INJECTION, SOLUTION INTRAMUSCULAR; INTRAVENOUS at 18:04

## 2023-02-18 RX ADMIN — SODIUM CHLORIDE 1000 ML: 0.9 INJECTION, SOLUTION INTRAVENOUS at 15:34

## 2023-02-18 NOTE — Clinical Note
Claryce Jeans was seen and treated in our emergency department on 2/18/2023  Diagnosis:     Eufemia    She may return on this date: 02/21/2023         If you have any questions or concerns, please don't hesitate to call        Nely Bacon PA-C    ______________________________           _______________          _______________  Hospital Representative                              Date                                Time

## 2023-02-18 NOTE — ED PROVIDER NOTES
History  Chief Complaint   Patient presents with   • Vomiting     Vomiting since this morning, denies sick contacts at home     Patient is a 54-year-old female no significant past medical presents for evaluation of nausea vomiting diarrhea and abdominal pain that started yesterday  Patient states she was recently in [facility] visiting family and returned home on Thursday, 2 days ago  She states that she ate lots of different foods there  She states her symptoms started last night and gradually worsened today  She states she has had multiple episodes of vomiting and diarrhea  No blood in the emesis or diarrhea  She states she has not been able to keep anything down today  She has had associated chills and headache  Denies any known sick contacts  She started her menstrual cycle today  Denies any urinary complaints  Denies fever, chest pain, shortness of breath, URI symptoms  Prior to Admission Medications   Prescriptions Last Dose Informant Patient Reported? Taking?   famotidine (PEPCID) 20 mg tablet   No No   Sig: Take 1 tablet (20 mg total) by mouth 2 (two) times a day for 10 days   omeprazole (PriLOSEC) 20 mg delayed release capsule   No No   Sig: Take 1 capsule (20 mg total) by mouth daily   ondansetron (ZOFRAN) 4 mg tablet Not Taking  No No   Sig: Take 1 tablet (4 mg total) by mouth every 8 (eight) hours as needed for nausea   Patient not taking: Reported on 2/18/2023   ondansetron (ZOFRAN-ODT) 4 mg disintegrating tablet   No Yes   Sig: Take 1 tablet (4 mg total) by mouth every 8 (eight) hours as needed for nausea for up to 10 doses   potassium chloride (K-DUR,KLOR-CON) 10 mEq tablet   No No   Sig: Take 1 tablet (10 mEq total) by mouth 2 (two) times a day for 2 days      Facility-Administered Medications: None       No past medical history on file  Past Surgical History:   Procedure Laterality Date   • TONSILLECTOMY         No family history on file    I have reviewed and agree with the history as documented  E-Cigarette/Vaping     E-Cigarette/Vaping Substances     Social History     Tobacco Use   • Smoking status: Every Day     Packs/day: 0 50     Types: Cigarettes   • Smokeless tobacco: Never   Substance Use Topics   • Alcohol use: Yes   • Drug use: No       Review of Systems   Constitutional: Positive for appetite change and chills  Negative for fever  HENT: Negative for congestion, ear pain, rhinorrhea and sore throat  Respiratory: Negative for cough and shortness of breath  Cardiovascular: Negative for chest pain  Gastrointestinal: Positive for abdominal pain, diarrhea, nausea and vomiting  Negative for blood in stool  Genitourinary: Negative for difficulty urinating and dysuria  Skin: Negative for rash  Neurological: Positive for headaches  All other systems reviewed and are negative  Physical Exam  Physical Exam  Vitals and nursing note reviewed  Constitutional:       General: She is not in acute distress  Appearance: Normal appearance  She is normal weight  She is not ill-appearing, toxic-appearing or diaphoretic  HENT:      Head: Normocephalic and atraumatic  Right Ear: External ear normal       Left Ear: External ear normal       Nose: Nose normal       Mouth/Throat:      Mouth: Mucous membranes are moist    Eyes:      Conjunctiva/sclera: Conjunctivae normal    Cardiovascular:      Rate and Rhythm: Normal rate and regular rhythm  Heart sounds: Normal heart sounds  No murmur heard  Pulmonary:      Effort: Pulmonary effort is normal  No respiratory distress  Breath sounds: Normal breath sounds  No stridor  No wheezing or rhonchi  Abdominal:      General: Abdomen is flat  Bowel sounds are normal  There is no distension  Palpations: Abdomen is soft  Tenderness: There is abdominal tenderness in the epigastric area  There is no guarding  Musculoskeletal:         General: Normal range of motion        Cervical back: Normal range of motion  Skin:     General: Skin is warm and dry  Neurological:      Mental Status: She is alert  Psychiatric:         Mood and Affect: Mood normal          Vital Signs  ED Triage Vitals [02/18/23 1502]   Temperature Pulse Respirations Blood Pressure SpO2   99 2 °F (37 3 °C) 95 19 145/82 100 %      Temp Source Heart Rate Source Patient Position - Orthostatic VS BP Location FiO2 (%)   Oral Monitor Sitting Right arm --      Pain Score       --           Vitals:    02/18/23 1502   BP: 145/82   Pulse: 95   Patient Position - Orthostatic VS: Sitting         Visual Acuity      ED Medications  Medications   Famotidine (PF) (PEPCID) injection 20 mg (20 mg Intravenous Given 2/18/23 1537)   morphine injection 2 mg (has no administration in time range)   sodium chloride 0 9 % bolus 1,000 mL (1,000 mL Intravenous New Bag 2/18/23 1534)   ondansetron (ZOFRAN) injection 4 mg (4 mg Intravenous Given 2/18/23 1536)   iohexol (OMNIPAQUE) 350 MG/ML injection (SINGLE-DOSE) 100 mL (100 mL Intravenous Given 2/18/23 1629)       Diagnostic Studies  Results Reviewed     Procedure Component Value Units Date/Time    Urine Microscopic [[id number]] Collected: 02/18/23 1615    Lab Status:  In process Specimen: Urine, Clean Catch Updated: 02/18/23 1631    POCT pregnancy, urine [[id number]]  (Normal) Resulted: 02/18/23 1621    Lab Status: Final result Updated: 02/18/23 1622     EXT Preg Test, Ur Negative     Control Valid    Urine Macroscopic, POC [[id number]]  (Abnormal) Collected: 02/18/23 1615    Lab Status: Final result Specimen: Urine Updated: 02/18/23 1617     Color, UA Red     Clarity, UA Cloudy     pH, UA 6 0     Leukocytes, UA Trace     Nitrite, UA Negative     Protein,  (2+) mg/dl      Glucose, UA Negative mg/dl      Ketones, UA 80 (3+) mg/dl      Urobilinogen, UA 0 2 E U /dl      Bilirubin, UA Negative     Occult Blood, UA Large     Specific [place]ity, UA >=1 030    Narrative:      CLINITEK RESULT    Comprehensive metabolic panel [[id number]]  (Abnormal) Collected: 02/18/23 1520    Lab Status: Final result Specimen: Blood from Arm, Right Updated: 02/18/23 1612     Sodium 139 mmol/L      Potassium 3 1 mmol/L      Chloride 105 mmol/L      CO2 26 mmol/L      ANION GAP 8 mmol/L      BUN 9 mg/dL      Creatinine 0 71 mg/dL      Glucose 159 mg/dL      Calcium 8 5 mg/dL      AST 45 U/L      ALT 43 U/L      Alkaline Phosphatase 66 U/L      Total Protein 7 2 g/dL      Albumin 4 5 g/dL      Total Bilirubin 0 43 mg/dL      eGFR 101 ml/min/1 73sq m     Narrative:      National Kidney Disease Foundation guidelines for Chronic Kidney Disease (CKD):   •  Stage 1 with normal or high GFR (GFR > 90 mL/min/1 73 square meters)  •  Stage 2 Mild CKD (GFR = 60-89 mL/min/1 73 square meters)  •  Stage 3A Moderate CKD (GFR = 45-59 mL/min/1 73 square meters)  •  Stage 3B Moderate CKD (GFR = 30-44 mL/min/1 73 square meters)  •  Stage 4 Severe CKD (GFR = 15-29 mL/min/1 73 square meters)  •  Stage 5 End Stage CKD (GFR <15 mL/min/1 73 square meters)  Note: GFR calculation is accurate only with a steady state creatinine    Lipase [[id number]]  (Normal) Collected: 02/18/23 1520    Lab Status: Final result Specimen: Blood from Arm, Right Updated: 02/18/23 1612     Lipase 27 u/L     CBC and differential [[id number]]  (Abnormal) Collected: 02/18/23 1520    Lab Status: Final result Specimen: Blood from Arm, Right Updated: 02/18/23 1550     WBC 7 15 Thousand/uL      RBC 4 06 Million/uL      Hemoglobin 13 7 g/dL      Hematocrit 40 1 %      MCV 99 fL      MCH 33 7 pg      MCHC 34 2 g/dL      RDW 12 8 %      MPV 9 5 fL      Platelets 166 Thousands/uL      nRBC 0 /100 WBCs      Neutrophils Relative 83 %      Immat GRANS % 0 %      Lymphocytes Relative 12 %      Monocytes Relative 5 %      Eosinophils Relative 0 %      Basophils Relative 0 %      Neutrophils Absolute 5 79 Thousands/µL      Immature Grans Absolute 0 03 Thousand/uL      Lymphocytes Absolute 0 89 Thousands/µL      Monocytes Absolute 0 38 Thousand/µL      Eosinophils Absolute 0 03 Thousand/µL      Basophils Absolute 0 03 Thousands/µL                  CT abdomen pelvis with contrast    (Results Pending)              Procedures  Procedures         ED Course  ED Course as of 02/18/23 1700   Sat Feb 18, 2023   1520 WBC: 7 15   1520 Hemoglobin: 13 7   1623 Potassium(!): 3 1  Patient has had hypokalemia in the past, most recent from 8 months ago also 3 1  Will replace    1623 Glucose, Random(!): 159   1623 AST(!): 45  Most recent from 8 months ago was AST 17   1623 Lipase: 27   1624 PREGNANCY TEST URINE: Negative   1624 UA likely contaminate as patient on her menstrual cycle currently  SBIRT 20yo+    Flowsheet Row Most Recent Value   SBIRT (23 yo +)    In order to provide better care to our patients, we are screening all of our patients for alcohol and drug use  Would it be okay to ask you these screening questions? Unable to answer at this time Filed at: 02/18/2023 1526                    Medical Decision Making  Patient is a 44-year-old female with no significant past medical history started with nausea vomiting diarrhea and abdominal pain yesterday  Denies blood in emesis or diarrhea  Unable to keep down food or liquid today  She recently returned home from [facility]  No known sick contacts or exposures  Patient with nausea and dry heaving on exam   Tenderness to the epigastric region  We will check basic labs, UA and CT scan abdomen pelvis  Will give IV fluids, Zofran, Pepcid and reassess  Lab evaluation with no leukocytosis  Potassium 3 1 which is comparable to previous 8 months ago  Plan to give PO potassium if CT scan normal   Glucose 159  AST 45  Urine pregnancy negative  UA likely contaminant as patient is on her menstrual cycle currently  Currently pending CT scan abdomen pelvis results  Patient reassessed    She states she feels improved with her nausea however she still has epigastric pain  Will give 2 mg morphine IV here as patient states she has a ride home  Discussed all results thus far with patient  Signed out to Roman Polanco PA-C at 5:00pm pending CT scan results, reassessment and final disposition  Epigastric abdominal pain: acute illness or injury  Hypokalemia: acute illness or injury  Nausea vomiting and diarrhea: acute illness or injury  Amount and/or Complexity of Data Reviewed  Labs: ordered  Decision-making details documented in ED Course  Radiology: ordered  Risk  Prescription drug management  Disposition  Final diagnoses:   Nausea vomiting and diarrhea   Epigastric abdominal pain   Hypokalemia     Time reflects when diagnosis was documented in both MDM as applicable and the Disposition within this note     Time User Action Codes Description Comment    2/18/2023  4:58 PM Sukh Shelton Add [R11 2,  R19 7] Nausea vomiting and diarrhea     2/18/2023  4:58 PM Sukh Cat Add [R10 13] Epigastric abdominal pain     2/18/2023  4:59 PM Gordy Levy [E87 6] Hypokalemia       ED Disposition     None      Follow-up Information     Follow up With Specialties Details Why Contact Info Additional [address] [facility] Medicine Schedule an appointment as soon as possible for a visit in 2 days As needed if you do not have a PCP   You should have your potassium level rechecked [address], [address] [facility], [address], [address] [place], [place], [address] [facility] Emergency Department Emergency Medicine  If symptoms worsen [facility] [id number]  [address] [place] [address] Emergency Department, [address] [place] [address] , [place], [place], [address]          Patient's Medications   Discharge Prescriptions    No medications on file       No discharge procedures on file      PDMP Review     None          ED Provider  Electronically Signed by           Marco Chandler PA-C  02/18/23 3548

## 2023-02-18 NOTE — ED NOTES
Pt unable to take PO potassium reports "my stomach hurts and I dont think its a good idea"   Provider notified     Stef Avilez RN  02/18/23 0150

## 2023-02-18 NOTE — Clinical Note
Meagan Shoulder was seen and treated in our emergency department on 2/18/2023  Diagnosis:     Eufemia    She may return on this date: 02/21/2023         If you have any questions or concerns, please don't hesitate to call        Asad Arevalo PA-C    ______________________________           _______________          _______________  Hospital Representative                              Date                                Time

## 2023-02-18 NOTE — ED CARE HANDOFF
Emergency Department Sign Out Note        Sign out and transfer of care from [facility]  See Separate Emergency Department note  The patient, Caesar Rey, was evaluated by the previous provider for abdominal pain, nausea/vomiting, diarrhea  Workup Completed:  Labs Reviewed   CBC AND DIFFERENTIAL - Abnormal       Result Value Ref Range Status    WBC 7 15  4 31 - 10 16 Thousand/uL Final    RBC 4 06  3 81 - 5 12 Million/uL Final    Hemoglobin 13 7  11 5 - 15 4 g/dL Final    Hematocrit 40 1  34 8 - 46 1 % Final    MCV 99 (*) 82 - 98 fL Final    MCH 33 7  26 8 - 34 3 pg Final    MCHC 34 2  31 4 - 37 4 g/dL Final    RDW 12 8  11 6 - 15 1 % Final    MPV 9 5  8 9 - 12 7 fL Final    Platelets 028  067 - 390 Thousands/uL Final    nRBC 0  /100 WBCs Final    Neutrophils Relative 83 (*) 43 - 75 % Final    Immat GRANS % 0  0 - 2 % Final    Lymphocytes Relative 12 (*) 14 - 44 % Final    Monocytes Relative 5  4 - 12 % Final    Eosinophils Relative 0  0 - 6 % Final    Basophils Relative 0  0 - 1 % Final    Neutrophils Absolute 5 79  1 85 - 7 62 Thousands/µL Final    Immature Grans Absolute 0 03  0 00 - 0 20 Thousand/uL Final    Lymphocytes Absolute 0 89  0 60 - 4 47 Thousands/µL Final    Monocytes Absolute 0 38  0 17 - 1 22 Thousand/µL Final    Eosinophils Absolute 0 03  0 00 - 0 61 Thousand/µL Final    Basophils Absolute 0 03  0 00 - 0 10 Thousands/µL Final   COMPREHENSIVE METABOLIC PANEL - Abnormal    Sodium 139  135 - 147 mmol/L Final    Potassium 3 1 (*) 3 5 - 5 3 mmol/L Final    Chloride 105  96 - 108 mmol/L Final    CO2 26  21 - 32 mmol/L Final    ANION GAP 8  4 - 13 mmol/L Final    BUN 9  5 - 25 mg/dL Final    Creatinine 0 71  0 60 - 1 30 mg/dL Final    Comment: Standardized to IDMS reference method    Glucose 159 (*) 65 - 140 mg/dL Final    Comment: If the patient is fasting, the ADA then defines impaired fasting glucose as > 100 mg/dL and diabetes as > or equal to 123 mg/dL    Specimen collection should occur prior to Sulfasalazine administration due to the potential for falsely depressed results  Specimen collection should occur prior to Sulfapyridine administration due to the potential for falsely elevated results  Calcium 8 5  8 4 - 10 2 mg/dL Final    AST 45 (*) 13 - 39 U/L Final    Comment: Specimen collection should occur prior to Sulfasalazine administration due to the potential for falsely depressed results  ALT 43  7 - 52 U/L Final    Comment: Specimen collection should occur prior to Sulfasalazine administration due to the potential for falsely depressed results       Alkaline Phosphatase 66  34 - 104 U/L Final    Total Protein 7 2  6 4 - 8 4 g/dL Final    Albumin 4 5  3 5 - 5 0 g/dL Final    Total Bilirubin 0 43  0 20 - 1 00 mg/dL Final    eGFR 101  ml/min/1 73sq m Final    Narrative:     [place] guidelines for Chronic Kidney Disease (CKD):   •  Stage 1 with normal or high GFR (GFR > 90 mL/min/1 73 square meters)  •  Stage 2 Mild CKD (GFR = 60-89 mL/min/1 73 square meters)  •  Stage 3A Moderate CKD (GFR = 45-59 mL/min/1 73 square meters)  •  Stage 3B Moderate CKD (GFR = 30-44 mL/min/1 73 square meters)  •  Stage 4 Severe CKD (GFR = 15-29 mL/min/1 73 square meters)  •  Stage 5 End Stage CKD (GFR <15 mL/min/1 73 square meters)  Note: GFR calculation is accurate only with a steady state creatinine   URINE MICROSCOPIC - Abnormal    RBC, UA Innumerable (*) None Seen, 2-4 /hpf Final    WBC, UA 2-4  None Seen, 2-4, 5-60 /hpf Final    Epithelial Cells Occasional  None Seen, Occasional /hpf Final    Bacteria, UA Occasional  None Seen, Occasional /hpf Final   URINE MACROSCOPIC, POC - Abnormal    Color, UA Red   Final    Clarity, UA Cloudy   Final    pH, UA 6 0  4 5 - 8 0 Final    Leukocytes, UA Trace (*) Negative Final    Nitrite, UA Negative  Negative Final    Protein,  (2+) (*) Negative mg/dl Final    Glucose, UA Negative  Negative mg/dl Final    Ketones, UA 80 (3+) (*) Negative mg/dl Final    Urobilinogen, UA 0 2  0 2, 1 0 E U /dl E U /dl Final    Bilirubin, UA Negative  Negative Final    Occult Blood, UA Large (*) Negative Final    Specific Gravity, UA >=1 030  1 003 - 1 030 Final    Narrative:     CLINITEK RESULT   LIPASE - Normal    Lipase 27  11 - 82 u/L Final   POCT PREGNANCY, URINE - Normal    EXT Preg Test, Ur Negative   Final    Control Valid   Final     CT abdomen pelvis with contrast   Final Result   Motion artifacts   No acute intra-abdominal finding  Workstation performed: [id number]               ED Course / Workup Pending (followup):  -Awaiting CT abd/pelvis at time of my sign on  -Infectious gastroenteritis suspected                                  ED Course as of 02/18/23 2041   Sat Feb 18, 2023   1816 Pt reports continued nausea after Zofran  Will provide reglan  Procedures  Medical Decision Making  CT abd/pelvis without acute intraabdominal pathology  Mild hypokalemia noted on labs  Pt declines potassium supplementation here despite treatment of nausea, but does agree to take at home  She reported minimal relief with Zofran initially but did have improvement with reglan  Plan for discharge with supportive care for suspected infectious gastroenteritis  Will provide Zofran ODT PRN N/V, oral potassium supplementation  Follow up with PCP recommended  Return to ED indications reviewed  Epigastric abdominal pain: acute illness or injury  Hypokalemia: acute illness or injury  Nausea vomiting and diarrhea: acute illness or injury  Amount and/or Complexity of Data Reviewed  Labs: ordered  Decision-making details documented in ED Course  Radiology: ordered and independent interpretation performed  Risk  Prescription drug management                Disposition  Final diagnoses:   Nausea vomiting and diarrhea   Epigastric abdominal pain   Hypokalemia     Time reflects when diagnosis was documented in both MDM as applicable and the Disposition within this note     Time User Action Codes Description Comment    2/18/2023  4:58 PM Donnald Eren Add [R11 2,  R19 7] Nausea vomiting and diarrhea     2/18/2023  4:58 PM Donnald Eren Add [R10 13] Epigastric abdominal pain     2/18/2023  4:59 PM Donnald Eren Add [E87 6] Hypokalemia       ED Disposition     ED Disposition   Discharge    Condition   Stable    Date/Time   Sat Feb 18, 2023  6:48 PM    Comment   Angel Alcaraz discharge to home/self care  Follow-up Information     Follow up With Specialties Details Why Contact Info Additional [facility] Schedule an appointment as soon as possible for a visit in 2 days As needed if you do not have a PCP   You should have your potassium level rechecked [address], [address], [address], [address] [place], [place], [facility] Emergency Department Emergency Medicine  If symptoms worsen [facility] [id number]  [address] [place] [address] Emergency Department, [address] [place] [address], [id number]        Discharge Medication List as of 2/18/2023  6:54 PM      START taking these medications    Details   ondansetron (ZOFRAN-ODT) 4 mg disintegrating tablet Take 1 tablet (4 mg total) by mouth every 6 (six) hours as needed for nausea or vomiting, Starting Sat 2/18/2023, Normal      potassium chloride (K-DUR,KLOR-CON) 20 mEq tablet Take 1 tablet (20 mEq total) by mouth 2 (two) times a day for 5 days, Starting Sat 2/18/2023, Until u 2/23/2023, Normal         CONTINUE these medications which have NOT CHANGED    Details   famotidine (PEPCID) 20 mg tablet Take 1 tablet (20 mg total) by mouth 2 (two) times a day for 10 days, Starting Tue 11/19/2019, Until Fri 11/29/2019, Print      omeprazole (PriLOSEC) 20 mg delayed release capsule Take 1 capsule (20 mg total) by mouth daily, Starting Wed 6/15/2022, Normal           No discharge procedures on file         ED Provider  Electronically Signed by     Brittny Berger PA-C  02/18/23 2041

## 2023-12-17 ENCOUNTER — HOSPITAL ENCOUNTER (EMERGENCY)
Facility: HOSPITAL | Age: 48
Discharge: HOME | End: 2023-12-17
Attending: EMERGENCY MEDICINE | Admitting: EMERGENCY MEDICINE

## 2023-12-17 VITALS
RESPIRATION RATE: 16 BRPM | DIASTOLIC BLOOD PRESSURE: 68 MMHG | HEART RATE: 96 BPM | OXYGEN SATURATION: 100 % | TEMPERATURE: 98.3 F | SYSTOLIC BLOOD PRESSURE: 149 MMHG | WEIGHT: 136.69 LBS

## 2023-12-17 DIAGNOSIS — Z78.9 ALCOHOL INGESTION: ICD-10-CM

## 2023-12-17 DIAGNOSIS — R11.2 NAUSEA AND VOMITING: Primary | ICD-10-CM

## 2023-12-17 LAB
ALBUMIN SERPL BCP-MCNC: 4.5 G/DL (ref 3.5–5)
ALP SERPL-CCNC: 52 U/L (ref 34–104)
ALT SERPL W P-5'-P-CCNC: 31 U/L (ref 7–52)
ANION GAP SERPL CALCULATED.3IONS-SCNC: 14 MMOL/L
AST SERPL W P-5'-P-CCNC: 37 U/L (ref 13–39)
BACTERIA UR QL AUTO: ABNORMAL /HPF
BASOPHILS # BLD AUTO: 0.08 THOUSANDS/ÂΜL (ref 0–0.1)
BASOPHILS NFR BLD AUTO: 1 % (ref 0–1)
BILIRUB SERPL-MCNC: 0.43 MG/DL (ref 0.2–1)
BILIRUB UR QL STRIP: NEGATIVE
BUN SERPL-MCNC: 6 MG/DL (ref 5–25)
CALCIUM SERPL-MCNC: 8.9 MG/DL (ref 8.4–10.2)
CHLORIDE SERPL-SCNC: 105 MMOL/L (ref 96–108)
CLARITY UR: CLEAR
CO2 SERPL-SCNC: 21 MMOL/L (ref 21–32)
COLOR UR: YELLOW
CREAT SERPL-MCNC: 0.73 MG/DL (ref 0.6–1.3)
EOSINOPHIL # BLD AUTO: 0.01 THOUSAND/ÂΜL (ref 0–0.61)
EOSINOPHIL NFR BLD AUTO: 0 % (ref 0–6)
ERYTHROCYTE [DISTWIDTH] IN BLOOD BY AUTOMATED COUNT: 13.6 % (ref 11.6–15.1)
ETHANOL SERPL-MCNC: 235 MG/DL
EXT PREGNANCY TEST URINE: NEGATIVE
EXT. CONTROL: NORMAL
GFR SERPL CREATININE-BSD FRML MDRD: 97 ML/MIN/1.73SQ M
GLUCOSE SERPL-MCNC: 142 MG/DL (ref 65–140)
GLUCOSE UR STRIP-MCNC: NEGATIVE MG/DL
HCT VFR BLD AUTO: 44.9 % (ref 34.8–46.1)
HGB BLD-MCNC: 15.4 G/DL (ref 11.5–15.4)
HGB UR QL STRIP.AUTO: NEGATIVE
HYALINE CASTS #/AREA URNS LPF: ABNORMAL /LPF
IMM GRANULOCYTES # BLD AUTO: 0.04 THOUSAND/UL (ref 0–0.2)
IMM GRANULOCYTES NFR BLD AUTO: 0 % (ref 0–2)
KETONES UR STRIP-MCNC: ABNORMAL MG/DL
LEUKOCYTE ESTERASE UR QL STRIP: NEGATIVE
LIPASE SERPL-CCNC: 33 U/L (ref 11–82)
LYMPHOCYTES # BLD AUTO: 1.65 THOUSANDS/ÂΜL (ref 0.6–4.47)
LYMPHOCYTES NFR BLD AUTO: 15 % (ref 14–44)
MCH RBC QN AUTO: 34.1 PG (ref 26.8–34.3)
MCHC RBC AUTO-ENTMCNC: 34.3 G/DL (ref 31.4–37.4)
MCV RBC AUTO: 99 FL (ref 82–98)
MONOCYTES # BLD AUTO: 0.36 THOUSAND/ÂΜL (ref 0.17–1.22)
MONOCYTES NFR BLD AUTO: 3 % (ref 4–12)
MUCOUS THREADS UR QL AUTO: ABNORMAL
NEUTROPHILS # BLD AUTO: 8.64 THOUSANDS/ÂΜL (ref 1.85–7.62)
NEUTS SEG NFR BLD AUTO: 81 % (ref 43–75)
NITRITE UR QL STRIP: NEGATIVE
NON-SQ EPI CELLS URNS QL MICRO: ABNORMAL /HPF
NRBC BLD AUTO-RTO: 0 /100 WBCS
PH UR STRIP.AUTO: 5.5 [PH] (ref 4.5–8)
PLATELET # BLD AUTO: 286 THOUSANDS/UL (ref 149–390)
PMV BLD AUTO: 9.9 FL (ref 8.9–12.7)
POTASSIUM SERPL-SCNC: 3.9 MMOL/L (ref 3.5–5.3)
PROT SERPL-MCNC: 7.3 G/DL (ref 6.4–8.4)
PROT UR STRIP-MCNC: ABNORMAL MG/DL
RBC # BLD AUTO: 4.52 MILLION/UL (ref 3.81–5.12)
RBC #/AREA URNS AUTO: ABNORMAL /HPF
SODIUM SERPL-SCNC: 140 MMOL/L (ref 135–147)
SP GR UR STRIP.AUTO: >=1.03 (ref 1–1.03)
UROBILINOGEN UR QL STRIP.AUTO: 0.2 E.U./DL
WBC # BLD AUTO: 10.78 THOUSAND/UL (ref 4.31–10.16)
WBC #/AREA URNS AUTO: ABNORMAL /HPF

## 2023-12-17 PROCEDURE — 83690 ASSAY OF LIPASE: CPT | Performed by: EMERGENCY MEDICINE

## 2023-12-17 PROCEDURE — 82077 ASSAY SPEC XCP UR&BREATH IA: CPT | Performed by: EMERGENCY MEDICINE

## 2023-12-17 PROCEDURE — 96376 TX/PRO/DX INJ SAME DRUG ADON: CPT

## 2023-12-17 PROCEDURE — 96361 HYDRATE IV INFUSION ADD-ON: CPT

## 2023-12-17 PROCEDURE — 80053 COMPREHEN METABOLIC PANEL: CPT | Performed by: EMERGENCY MEDICINE

## 2023-12-17 PROCEDURE — 36415 COLL VENOUS BLD VENIPUNCTURE: CPT | Performed by: EMERGENCY MEDICINE

## 2023-12-17 PROCEDURE — 99283 EMERGENCY DEPT VISIT LOW MDM: CPT

## 2023-12-17 PROCEDURE — 81025 URINE PREGNANCY TEST: CPT | Performed by: EMERGENCY MEDICINE

## 2023-12-17 PROCEDURE — 81001 URINALYSIS AUTO W/SCOPE: CPT

## 2023-12-17 PROCEDURE — 96374 THER/PROPH/DIAG INJ IV PUSH: CPT

## 2023-12-17 PROCEDURE — 85025 COMPLETE CBC W/AUTO DIFF WBC: CPT | Performed by: EMERGENCY MEDICINE

## 2023-12-17 PROCEDURE — 99284 EMERGENCY DEPT VISIT MOD MDM: CPT | Performed by: EMERGENCY MEDICINE

## 2023-12-17 PROCEDURE — C9113 INJ PANTOPRAZOLE SODIUM, VIA: HCPCS | Performed by: EMERGENCY MEDICINE

## 2023-12-17 PROCEDURE — 96375 TX/PRO/DX INJ NEW DRUG ADDON: CPT

## 2023-12-17 RX ORDER — PANTOPRAZOLE SODIUM 40 MG/10ML
40 INJECTION, POWDER, LYOPHILIZED, FOR SOLUTION INTRAVENOUS ONCE
Status: COMPLETED | OUTPATIENT
Start: 2023-12-17 | End: 2023-12-17

## 2023-12-17 RX ORDER — MAGNESIUM HYDROXIDE/ALUMINUM HYDROXICE/SIMETHICONE 120; 1200; 1200 MG/30ML; MG/30ML; MG/30ML
30 SUSPENSION ORAL ONCE
Status: COMPLETED | OUTPATIENT
Start: 2023-12-17 | End: 2023-12-17

## 2023-12-17 RX ORDER — ONDANSETRON 2 MG/ML
4 INJECTION INTRAMUSCULAR; INTRAVENOUS ONCE
Status: COMPLETED | OUTPATIENT
Start: 2023-12-17 | End: 2023-12-17

## 2023-12-17 RX ORDER — LIDOCAINE HYDROCHLORIDE 20 MG/ML
15 SOLUTION OROPHARYNGEAL ONCE
Status: COMPLETED | OUTPATIENT
Start: 2023-12-17 | End: 2023-12-17

## 2023-12-17 RX ORDER — METOCLOPRAMIDE HYDROCHLORIDE 5 MG/ML
10 INJECTION INTRAMUSCULAR; INTRAVENOUS ONCE
Status: COMPLETED | OUTPATIENT
Start: 2023-12-17 | End: 2023-12-17

## 2023-12-17 RX ORDER — ONDANSETRON 4 MG/1
4 TABLET, ORALLY DISINTEGRATING ORAL EVERY 8 HOURS PRN
Qty: 10 TABLET | Refills: 0 | Status: SHIPPED | OUTPATIENT
Start: 2023-12-17 | End: 2023-12-22

## 2023-12-17 RX ORDER — DIPHENHYDRAMINE HYDROCHLORIDE 50 MG/ML
25 INJECTION INTRAMUSCULAR; INTRAVENOUS ONCE
Status: COMPLETED | OUTPATIENT
Start: 2023-12-17 | End: 2023-12-17

## 2023-12-17 RX ADMIN — DIPHENHYDRAMINE HYDROCHLORIDE 25 MG: 50 INJECTION, SOLUTION INTRAMUSCULAR; INTRAVENOUS at 18:20

## 2023-12-17 RX ADMIN — SODIUM CHLORIDE 1000 ML: 0.9 INJECTION, SOLUTION INTRAVENOUS at 14:29

## 2023-12-17 RX ADMIN — ALUMINUM HYDROXIDE, MAGNESIUM HYDROXIDE, AND SIMETHICONE 30 ML: 200; 200; 20 SUSPENSION ORAL at 16:37

## 2023-12-17 RX ADMIN — ONDANSETRON 4 MG: 2 INJECTION INTRAMUSCULAR; INTRAVENOUS at 14:47

## 2023-12-17 RX ADMIN — PANTOPRAZOLE SODIUM 40 MG: 40 INJECTION, POWDER, FOR SOLUTION INTRAVENOUS at 14:47

## 2023-12-17 RX ADMIN — LIDOCAINE HYDROCHLORIDE 15 ML: 20 SOLUTION ORAL at 16:37

## 2023-12-17 RX ADMIN — METOCLOPRAMIDE 10 MG: 5 INJECTION, SOLUTION INTRAMUSCULAR; INTRAVENOUS at 18:20

## 2023-12-17 RX ADMIN — ONDANSETRON 4 MG: 2 INJECTION INTRAMUSCULAR; INTRAVENOUS at 16:33

## 2023-12-17 NOTE — ED PROVIDER NOTES
History  Chief Complaint   Patient presents with    Vomiting     Pt c/o vomiting and diarrhea because of excessive alcohol last night. Pt denies cp/sob or fevers       History provided by:  Patient   used: No    Vomiting  Severity:  Moderate  Duration:  3 hours  Timing:  Intermittent  Number of daily episodes:  Unable to specify  Quality:  Unable to specify  Able to tolerate:  Liquids  Onset of vomiting after eating: Related to drinking Alcohol last night.  Progression:  Unchanged  Chronicity:  New  Recent urination:  Normal  Context: not post-tussive    Relieved by:  Nothing  Worsened by:  Nothing  Ineffective treatments:  None tried  Associated symptoms: no abdominal pain, no chills, no cough, no diarrhea, no fever, no headaches and no sore throat    Risk factors: alcohol use        Prior to Admission Medications   Prescriptions Last Dose Informant Patient Reported? Taking?   famotidine (PEPCID) 20 mg tablet   No No   Sig: Take 1 tablet (20 mg total) by mouth 2 (two) times a day for 10 days   omeprazole (PriLOSEC) 20 mg delayed release capsule   No No   Sig: Take 1 capsule (20 mg total) by mouth daily   ondansetron (ZOFRAN-ODT) 4 mg disintegrating tablet   No No   Sig: Take 1 tablet (4 mg total) by mouth every 6 (six) hours as needed for nausea or vomiting   potassium chloride (K-DUR,KLOR-CON) 20 mEq tablet   No No   Sig: Take 1 tablet (20 mEq total) by mouth 2 (two) times a day for 5 days      Facility-Administered Medications: None       History reviewed. No pertinent past medical history.    Past Surgical History:   Procedure Laterality Date    TONSILLECTOMY         History reviewed. No pertinent family history.  I have reviewed and agree with the history as documented.    E-Cigarette/Vaping    E-Cigarette Use Never User      E-Cigarette/Vaping Substances     Social History     Tobacco Use    Smoking status: Every Day     Current packs/day: 0.50     Types: Cigarettes    Smokeless tobacco:  Never   Vaping Use    Vaping status: Never Used   Substance Use Topics    Alcohol use: Yes    Drug use: No       Review of Systems   Constitutional:  Negative for chills and fever.   HENT:  Negative for facial swelling, sore throat and trouble swallowing.    Eyes:  Negative for pain and visual disturbance.   Respiratory:  Negative for cough, chest tightness and shortness of breath.    Cardiovascular:  Negative for chest pain and leg swelling.   Gastrointestinal:  Positive for vomiting. Negative for abdominal pain, diarrhea and nausea.   Genitourinary:  Negative for dysuria and flank pain.   Musculoskeletal:  Negative for back pain, neck pain and neck stiffness.   Skin:  Negative for pallor and rash.   Allergic/Immunologic: Negative for environmental allergies and immunocompromised state.   Neurological:  Negative for dizziness and headaches.   Hematological:  Negative for adenopathy. Does not bruise/bleed easily.   Psychiatric/Behavioral:  Negative for agitation and behavioral problems.    All other systems reviewed and are negative.      Physical Exam  Physical Exam  Vitals and nursing note reviewed.   Constitutional:       General: She is not in acute distress.     Appearance: She is well-developed.   HENT:      Head: Normocephalic and atraumatic.   Eyes:      Extraocular Movements: Extraocular movements intact.   Cardiovascular:      Rate and Rhythm: Normal rate and regular rhythm.   Pulmonary:      Effort: Pulmonary effort is normal. No respiratory distress.   Abdominal:      Palpations: Abdomen is soft.      Tenderness: There is no abdominal tenderness. There is no guarding or rebound.   Musculoskeletal:         General: Normal range of motion.      Cervical back: Normal range of motion and neck supple.   Skin:     General: Skin is warm and dry.   Neurological:      General: No focal deficit present.      Mental Status: She is alert and oriented to person, place, and time.   Psychiatric:         Mood and  Affect: Mood normal.         Behavior: Behavior normal.         Vital Signs  ED Triage Vitals [12/17/23 1344]   Temperature Pulse Respirations Blood Pressure SpO2   98.3 °F (36.8 °C) 90 18 128/66 99 %      Temp Source Heart Rate Source Patient Position - Orthostatic VS BP Location FiO2 (%)   Oral Monitor Sitting Right arm --      Pain Score       10 - Worst Possible Pain           Vitals:    12/17/23 1344 12/17/23 1604   BP: 128/66 147/67   Pulse: 90 93   Patient Position - Orthostatic VS: Sitting Lying         Visual Acuity      ED Medications  Medications   sodium chloride 0.9 % bolus 1,000 mL (1,000 mL Intravenous New Bag 12/17/23 1429)   ondansetron (ZOFRAN) injection 4 mg (4 mg Intravenous Given 12/17/23 1447)   pantoprazole (PROTONIX) injection 40 mg (40 mg Intravenous Given 12/17/23 1447)   aluminum-magnesium hydroxide-simethicone (MAALOX) oral suspension 30 mL (30 mL Oral Given 12/17/23 1637)   Lidocaine Viscous HCl (XYLOCAINE) 2 % mucosal solution 15 mL (15 mL Swish & Spit Given 12/17/23 1637)   ondansetron (ZOFRAN) injection 4 mg (4 mg Intravenous Given 12/17/23 1633)       Diagnostic Studies  Results Reviewed       Procedure Component Value Units Date/Time    Urine Microscopic [[id number]] Collected: 12/17/23 1711    Lab Status: In process Specimen: Urine, Clean Catch Updated: 12/17/23 1720    Urine Macroscopic, POC [[id number]]  (Abnormal) Collected: 12/17/23 1711    Lab Status: Final result Specimen: Urine Updated: 12/17/23 1713     Color, UA Yellow     Clarity, UA Clear     pH, UA 5.5     Leukocytes, UA Negative     Nitrite, UA Negative     Protein, UA 30 (1+) mg/dl      Glucose, UA Negative mg/dl      Ketones, UA 80 (3+) mg/dl      Urobilinogen, UA 0.2 E.U./dl      Bilirubin, UA Negative     Occult Blood, UA Negative     Specific Gravity, UA >=1.030    Narrative:      CLINITEK RESULT    Comprehensive metabolic panel [[id number]]  (Abnormal) Collected: 12/17/23 1431    Lab Status: Final result Specimen:  Blood from Arm, Right Updated: 12/17/23 1505     Sodium 140 mmol/L      Potassium 3.9 mmol/L      Chloride 105 mmol/L      CO2 21 mmol/L      ANION GAP 14 mmol/L      BUN 6 mg/dL      Creatinine 0.73 mg/dL      Glucose 142 mg/dL      Calcium 8.9 mg/dL      AST 37 U/L      ALT 31 U/L      Alkaline Phosphatase 52 U/L      Total Protein 7.3 g/dL      Albumin 4.5 g/dL      Total Bilirubin 0.43 mg/dL      eGFR 97 ml/min/1.73sq m     Narrative:      National Kid[employer] Disease Foundation guidelines for Chronic Kidney Disease (CKD):     Stage 1 with normal or high GFR (GFR > 90 mL/min/1.73 square meters)    Stage 2 Mild CKD (GFR = 60-89 mL/min/1.73 square meters)    Stage 3A Moderate CKD (GFR = 45-59 mL/min/1.73 square meters)    Stage 3B Moderate CKD (GFR = 30-44 mL/min/1.73 square meters)    Stage 4 Severe CKD (GFR = 15-29 mL/min/1.73 square meters)    Stage 5 End Stage CKD (GFR <15 mL/min/1.73 square meters)  Note: GFR calculation is accurate only with a steady state creatinine    Lipase [[id number]]  (Normal) Collected: 12/17/23 1431    Lab Status: Final result Specimen: Blood from Arm, Right Updated: 12/17/23 1505     Lipase 33 u/L     Ethanol [[id number]]  (Abnormal) Collected: 12/17/23 1431    Lab Status: Final result Specimen: Blood from Arm, Right Updated: 12/17/23 1505     Ethanol Lvl 235 mg/dL     CBC and differential [[id number]]  (Abnormal) Collected: 12/17/23 1431    Lab Status: Final result Specimen: Blood from Arm, Right Updated: 12/17/23 1437     WBC 10.78 Thousand/uL      RBC 4.52 Million/uL      Hemoglobin 15.4 g/dL      Hematocrit 44.9 %      MCV 99 fL      MCH 34.1 pg      MCHC 34.3 g/dL      RDW 13.6 %      MPV 9.9 fL      Platelets 286 Thousands/uL      nRBC 0 /100 WBCs      Neutrophils Relative 81 %      Immat GRANS % 0 %      Lymphocytes Relative 15 %      Monocytes Relative 3 %      Eosinophils Relative 0 %      Basophils Relative 1 %      Neutrophils Absolute 8.64 Thousands/µL      Immature Grans  Absolute 0.04 Thousand/uL      Lymphocytes Absolute 1.65 Thousands/µL      Monocytes Absolute 0.36 Thousand/µL      Eosinophils Absolute 0.01 Thousand/µL      Basophils Absolute 0.08 Thousands/µL     POCT pregnancy, urine [[id number]]     Lab Status: No result                    No orders to display              Procedures  Procedures         ED Course  ED Course as of 12/17/23 1721   Sun Dec 17, 2023   1455 WBC(!): 10.78   1455 Hemoglobin: 15.4   1455 Platelet Count: 286  CBC noted.   1507 Sodium: 140   1507 Potassium: 3.9   1507 BUN: 6   1507 Creatinine: 0.73   1507 Glucose, Random(!): 142   1507 Lipase: 33  CMP, Lipase non-acute.   1507 MEDICAL ALCOHOL(!): 235  Alcohol level noted.   1626 Reevaluated, sleeping comfortably in bed.     Note: Patient signed out to Dr. Vanda Cervantes for re-evaluation when patient is more awake and ready for discharge.                                        Medical Decision Making  Patient is a 48-year-old female, comes in with complaints of vomiting, diarrhea, patient states that she drank a bottle of tequila last night, woke up today with vomiting and diarrhea patient denies abdominal pain, blood in vomiting or diarrhea, no chest pain or dyspnea. On exam, patient is conscious, alert, vital signs are stable, abdomen soft, nontender, non-distended.  Differential diagnosis: Gastritis, pancreatitis, GI viral illness, will check labs including CBC, CMP, lipase, give IV fluids, Zofran, Protonix.    Problems Addressed:  Alcohol ingestion: acute illness or injury  Nausea and vomiting: acute illness or injury    Amount and/or Complexity of Data Reviewed  Labs: ordered. Decision-making details documented in ED Course.    Risk  OTC drugs.  Prescription drug management.             Disposition  Final diagnoses:   Nausea and vomiting   Alcohol ingestion     Time reflects when diagnosis was documented in both MDM as applicable and the Disposition within this note       Time User Action  Codes Description Comment    12/17/2023  2:38 PM Narciso Dumont [R11.2] Nausea and vomiting     12/17/2023  4:34 PM Narciso Dumont [Z78.9] Alcohol ingestion           ED Disposition       None          Follow-up Information       Follow up With Specialties Details Why Contact Info Additional Information    [address], [address] [address] [phone] [facility], [address] [place] [address], [place] [address], [place], [place], [address] [phone]            Patient's Medications   Discharge Prescriptions    ONDANSETRON (ZOFRAN-ODT) 4 MG DISINTEGRATING TABLET    Take 1 tablet (4 mg total) by mouth every 8 (eight) hours as needed for nausea or vomiting for up to 5 days       Start Date: 12/17/2023End Date: 12/22/2023       Order Dose: 4 mg       Quantity: 10 tablet    Refills: 0       No discharge procedures on file.    PDMP Review       None            ED Provider  Electronically Signed by             Narciso Dumont MD  12/17/23 8731